# Patient Record
Sex: FEMALE | Race: WHITE | NOT HISPANIC OR LATINO | Employment: OTHER | URBAN - NONMETROPOLITAN AREA
[De-identification: names, ages, dates, MRNs, and addresses within clinical notes are randomized per-mention and may not be internally consistent; named-entity substitution may affect disease eponyms.]

---

## 2022-03-23 ENCOUNTER — OFFICE VISIT (OUTPATIENT)
Dept: FAMILY MEDICINE CLINIC | Facility: CLINIC | Age: 62
End: 2022-03-23

## 2022-03-23 VITALS — BODY MASS INDEX: 37.47 KG/M2 | WEIGHT: 253 LBS | HEIGHT: 69 IN

## 2022-03-23 DIAGNOSIS — R06.02 SHORTNESS OF BREATH: Primary | ICD-10-CM

## 2022-03-23 PROCEDURE — 99214 OFFICE O/P EST MOD 30 MIN: CPT | Performed by: PHYSICIAN ASSISTANT

## 2022-03-23 RX ORDER — FLUOXETINE HYDROCHLORIDE 40 MG/1
CAPSULE ORAL
COMMUNITY
Start: 2022-03-11 | End: 2023-03-10 | Stop reason: SDUPTHER

## 2022-03-23 RX ORDER — ALBUTEROL SULFATE 90 UG/1
AEROSOL, METERED RESPIRATORY (INHALATION)
COMMUNITY
End: 2022-04-13 | Stop reason: SDUPTHER

## 2022-03-23 RX ORDER — LEVOTHYROXINE SODIUM 0.07 MG/1
TABLET ORAL
COMMUNITY
End: 2023-03-10 | Stop reason: SDUPTHER

## 2022-03-23 RX ORDER — DICLOFENAC SODIUM 75 MG/1
TABLET, DELAYED RELEASE ORAL
COMMUNITY
End: 2022-04-14

## 2022-03-23 RX ORDER — PSEUDOEPHEDRINE HCL 30 MG
TABLET ORAL
COMMUNITY

## 2022-03-23 RX ORDER — FUROSEMIDE 20 MG/1
20 TABLET ORAL 2 TIMES DAILY
Qty: 30 TABLET | Refills: 5 | Status: SHIPPED | OUTPATIENT
Start: 2022-03-23 | End: 2022-06-13

## 2022-03-23 RX ORDER — OXYBUTYNIN CHLORIDE 5 MG/1
TABLET ORAL
COMMUNITY
End: 2023-03-10 | Stop reason: SDUPTHER

## 2022-03-23 RX ORDER — PANTOPRAZOLE SODIUM 20 MG/1
TABLET, DELAYED RELEASE ORAL
COMMUNITY
Start: 2022-02-04 | End: 2023-03-10 | Stop reason: SDUPTHER

## 2022-03-23 RX ORDER — FLUOXETINE HYDROCHLORIDE 40 MG/1
CAPSULE ORAL
COMMUNITY
End: 2022-03-25 | Stop reason: SDUPTHER

## 2022-03-23 RX ORDER — IBUPROFEN 800 MG/1
TABLET ORAL
COMMUNITY
Start: 2022-01-18 | End: 2022-03-23

## 2022-03-23 RX ORDER — HYDROCODONE BITARTRATE AND ACETAMINOPHEN 10; 325 MG/1; MG/1
TABLET ORAL
COMMUNITY
End: 2022-04-14 | Stop reason: ALTCHOICE

## 2022-03-23 RX ORDER — METOCLOPRAMIDE 10 MG/1
TABLET ORAL
COMMUNITY
End: 2023-03-10 | Stop reason: SDUPTHER

## 2022-03-23 RX ORDER — GABAPENTIN 800 MG/1
TABLET ORAL
COMMUNITY
Start: 2021-10-25 | End: 2022-04-14 | Stop reason: SDUPTHER

## 2022-03-23 NOTE — PROGRESS NOTES
"Chief Complaint  Cough, Nasal Congestion (X2 wks), and Shortness of Breath (Patient reports ongoing shortness of breath. )    Subjective  You have chosen to receive care through a telehealth visit.  Do you consent to use a video/audio connection for your medical care today? Yes        Aanstasiia Avilez presents to [facility]  History of Present Illness Ms. Campos reports ongoing upper respiratory symptoms for the past 2 weeks.  She complains of shortness of breath, cough and congestion.  She has been treated on 2 different occasions for upper respiratory infections with antibiotics and steroids with no improvement in her symptoms.  On Monday she presented to the emergency room reporting the same types of symptoms they did a work-up including EKG chest x-ray labs etc. and subsequently told her she had congestive heart failure and she needed to follow-up with a cardiologist.  She has an appointment but cannot get in until sometime in April and she is doing a phone visit today because ER told her she might benefit from fluid pills however they did not prescribe any.    Objective     Vital Signs:   Ht 175.3 cm (69\")   Wt 115 kg (253 lb)   BMI 37.36 kg/m²     Body mass index is 37.36 kg/m².    Review of Systems   Constitutional: Negative.  Negative for fever.   HENT: Positive for postnasal drip.    Respiratory: Positive for cough and shortness of breath. Negative for wheezing.    Cardiovascular: Negative for chest pain, palpitations and leg swelling.   Neurological: Negative.    Psychiatric/Behavioral: Negative.        Social History: reports that she has been smoking cigarettes. She has a 40.00 pack-year smoking history. She has never used smokeless tobacco. She reports that she does not drink alcohol and does not use drugs.      Current Outpatient Medications:   •  albuterol sulfate  (90 Base) MCG/ACT inhaler, Ventolin HFA 90 mcg/actuation aerosol inhaler, Disp: , Rfl:   •  " diclofenac (VOLTAREN) 75 MG EC tablet, diclofenac sodium 75 mg tablet,delayed release, Disp: , Rfl:   •  docusate sodium 100 MG capsule, docusate sodium 100 mg capsule, Disp: , Rfl:   •  FLUoxetine (PROzac) 40 MG capsule, , Disp: , Rfl:   •  FLUoxetine (PROzac) 40 MG capsule, fluoxetine 40 mg capsule, Disp: , Rfl:   •  gabapentin (NEURONTIN) 800 MG tablet, gabapentin 800 mg tablet, Disp: , Rfl:   •  HYDROcodone-acetaminophen (NORCO)  MG per tablet, hydrocodone 10 mg-acetaminophen 325 mg tablet, Disp: , Rfl:   •  levothyroxine (SYNTHROID, LEVOTHROID) 75 MCG tablet, levothyroxine 75 mcg tablet, Disp: , Rfl:   •  metoclopramide (REGLAN) 10 MG tablet, metoclopramide 10 mg tablet, Disp: , Rfl:   •  oxybutynin (DITROPAN) 5 MG tablet, oxybutynin chloride 5 mg tablet, Disp: , Rfl:   •  pantoprazole (PROTONIX) 20 MG EC tablet, pantoprazole 20 mg tablet,delayed release, Disp: , Rfl:   •  ProAir  (90 Base) MCG/ACT inhaler, , Disp: , Rfl:   •  furosemide (Lasix) 20 MG tablet, Take 1 tablet by mouth 2 (Two) Times a Day., Disp: 30 tablet, Rfl: 5    Allergies: Sulfa antibiotics    Physical Exam  Constitutional:       Appearance: Normal appearance.   Neurological:      Mental Status: She is alert and oriented to person, place, and time.             Assessment and Plan   Diagnoses and all orders for this visit:    1. Shortness of breath (Primary)  Assessment & Plan:  I am going to start her on Lasix and see her in the office on Friday to see how she is responding. She is to go to the ER if her shortness of breath worsens or she experiences chest pain.        Other orders  -     furosemide (Lasix) 20 MG tablet; Take 1 tablet by mouth 2 (Two) Times a Day.  Dispense: 30 tablet; Refill: 5          Follow Up   Return in about 2 days (around 3/25/2022).  Patient was given instructions and counseling regarding her condition or for health maintenance advice. Please see specific information pulled into the AVS if appropriate.      Radha Means, PA-C

## 2022-03-23 NOTE — ASSESSMENT & PLAN NOTE
I am going to start her on Lasix and see her in the office on Friday to see how she is responding. She is to go to the ER if her shortness of breath worsens or she experiences chest pain.

## 2022-03-24 PROBLEM — M53.9 MULTILEVEL DEGENERATIVE DISC DISEASE: Status: ACTIVE | Noted: 2022-03-24

## 2022-03-24 PROBLEM — N18.31 STAGE 3A CHRONIC KIDNEY DISEASE: Status: ACTIVE | Noted: 2022-03-24

## 2022-03-25 ENCOUNTER — OFFICE VISIT (OUTPATIENT)
Dept: FAMILY MEDICINE CLINIC | Facility: CLINIC | Age: 62
End: 2022-03-25

## 2022-03-25 VITALS
DIASTOLIC BLOOD PRESSURE: 86 MMHG | SYSTOLIC BLOOD PRESSURE: 124 MMHG | HEART RATE: 71 BPM | WEIGHT: 249.8 LBS | HEIGHT: 69 IN | OXYGEN SATURATION: 98 % | BODY MASS INDEX: 37 KG/M2

## 2022-03-25 DIAGNOSIS — R06.02 SHORTNESS OF BREATH: Primary | ICD-10-CM

## 2022-03-25 PROBLEM — M54.16 LUMBAR RADICULOPATHY: Status: ACTIVE | Noted: 2019-07-25

## 2022-03-25 PROBLEM — M47.817 LUMBOSACRAL SPONDYLOSIS WITHOUT MYELOPATHY: Status: ACTIVE | Noted: 2019-07-25

## 2022-03-25 PROCEDURE — 99214 OFFICE O/P EST MOD 30 MIN: CPT | Performed by: PHYSICIAN ASSISTANT

## 2022-03-25 RX ORDER — HYDROXYZINE HYDROCHLORIDE 25 MG/1
TABLET, FILM COATED ORAL
COMMUNITY
Start: 2022-02-08 | End: 2022-06-24

## 2022-03-25 RX ORDER — TRAZODONE HYDROCHLORIDE 150 MG/1
TABLET ORAL
COMMUNITY
End: 2022-03-25 | Stop reason: SDUPTHER

## 2022-03-25 RX ORDER — TRAZODONE HYDROCHLORIDE 150 MG/1
TABLET ORAL
COMMUNITY
Start: 2022-03-11 | End: 2022-05-06

## 2022-03-25 RX ORDER — SILVER SULFADIAZINE 1 %
CREAM (GRAM) TOPICAL
COMMUNITY
Start: 2022-01-10

## 2022-03-25 RX ORDER — PREDNISONE 20 MG/1
TABLET ORAL
COMMUNITY
End: 2022-03-25

## 2022-03-25 RX ORDER — LEVOFLOXACIN 500 MG/1
500 TABLET, FILM COATED ORAL DAILY
Qty: 10 TABLET | Refills: 0 | Status: SHIPPED | OUTPATIENT
Start: 2022-03-25 | End: 2022-04-14

## 2022-03-25 RX ORDER — PROMETHAZINE HYDROCHLORIDE 25 MG/1
25 TABLET ORAL EVERY 6 HOURS PRN
Qty: 20 TABLET | Refills: 1 | Status: SHIPPED | OUTPATIENT
Start: 2022-03-25 | End: 2022-04-22 | Stop reason: SDUPTHER

## 2022-03-25 RX ORDER — TOLTERODINE 4 MG/1
CAPSULE, EXTENDED RELEASE ORAL
COMMUNITY
End: 2023-03-10 | Stop reason: SDUPTHER

## 2022-03-25 RX ORDER — PROMETHAZINE HYDROCHLORIDE 25 MG/1
TABLET ORAL EVERY 4 HOURS
COMMUNITY
Start: 2022-03-10 | End: 2022-03-25 | Stop reason: SDUPTHER

## 2022-03-25 RX ORDER — TIZANIDINE 4 MG/1
TABLET ORAL
COMMUNITY
End: 2022-04-20

## 2022-03-25 NOTE — PROGRESS NOTES
"Chief Complaint  Shortness of Breath (X2 months)    Subjective          Anastasiia Avilez presents to [facility]  History of Present Illness She comes in because she continues to complain of SOA.  She is still having some cough but that has gradually improved, she stated that she went to the ER and they did an EKG, Labs and a CXR and told her she had an enlarged heart and needed to see a cardiologist.  She had a telehealth visit a few days ago and I put her on Lasix and told her to come in, but she says the Lasix has not helped.     Objective     Vital Signs:   /86 (BP Location: Right arm, Patient Position: Sitting, Cuff Size: Large Adult)   Pulse 71   Ht 175.3 cm (69\")   Wt 113 kg (249 lb 12.8 oz)   SpO2 98%   BMI 36.89 kg/m²     Body mass index is 36.89 kg/m².    Review of Systems   Constitutional: Negative.    HENT: Negative.    Respiratory: Positive for cough and shortness of breath. Negative for chest tightness and wheezing.    Cardiovascular: Negative for chest pain, palpitations and leg swelling.   Psychiatric/Behavioral: Negative.        Social History: reports that she has been smoking cigarettes. She has a 40.00 pack-year smoking history. She has never used smokeless tobacco. She reports that she does not drink alcohol and does not use drugs.      Current Outpatient Medications:   •  albuterol sulfate  (90 Base) MCG/ACT inhaler, Ventolin HFA 90 mcg/actuation aerosol inhaler, Disp: , Rfl:   •  diclofenac (VOLTAREN) 75 MG EC tablet, diclofenac sodium 75 mg tablet,delayed release, Disp: , Rfl:   •  FLUoxetine (PROzac) 40 MG capsule, , Disp: , Rfl:   •  furosemide (Lasix) 20 MG tablet, Take 1 tablet by mouth 2 (Two) Times a Day., Disp: 30 tablet, Rfl: 5  •  gabapentin (NEURONTIN) 800 MG tablet, gabapentin 800 mg tablet, Disp: , Rfl:   •  levothyroxine (SYNTHROID, LEVOTHROID) 75 MCG tablet, levothyroxine 75 mcg tablet, Disp: , Rfl:   •  metoclopramide (REGLAN) 10 MG " tablet, metoclopramide 10 mg tablet, Disp: , Rfl:   •  oxybutynin (DITROPAN) 5 MG tablet, oxybutynin chloride 5 mg tablet, Disp: , Rfl:   •  pantoprazole (PROTONIX) 20 MG EC tablet, pantoprazole 20 mg tablet,delayed release, Disp: , Rfl:   •  ProAir  (90 Base) MCG/ACT inhaler, , Disp: , Rfl:   •  promethazine (PHENERGAN) 25 MG tablet, Take 1 tablet by mouth Every 6 (Six) Hours As Needed for Nausea or Vomiting., Disp: 20 tablet, Rfl: 1  •  SSD 1 % cream, , Disp: , Rfl:   •  tiZANidine (ZANAFLEX) 4 MG tablet, tizanidine 4 mg tablet, Disp: , Rfl:   •  tolterodine LA (DETROL LA) 4 MG 24 hr capsule, tolterodine ER 4 mg capsule,extended release 24 hr, Disp: , Rfl:   •  traZODone (DESYREL) 150 MG tablet, , Disp: , Rfl:   •  docusate sodium 100 MG capsule, docusate sodium 100 mg capsule, Disp: , Rfl:   •  Fluticasone Furoate-Vilanterol (Breo Ellipta) 100-25 MCG/INH inhaler, Inhale 1 puff Daily., Disp: 1 each, Rfl: 12  •  HYDROcodone-acetaminophen (NORCO)  MG per tablet, hydrocodone 10 mg-acetaminophen 325 mg tablet, Disp: , Rfl:   •  hydrOXYzine (ATARAX) 25 MG tablet, , Disp: , Rfl:   •  levoFLOXacin (LEVAQUIN) 500 MG tablet, Take 1 tablet by mouth Daily., Disp: 10 tablet, Rfl: 0    Allergies: Sulfa antibiotics    Physical Exam  Constitutional:       Appearance: She is obese.   Cardiovascular:      Rate and Rhythm: Normal rate and regular rhythm.      Heart sounds: Normal heart sounds.   Pulmonary:      Effort: Pulmonary effort is normal.      Breath sounds: Normal breath sounds.   Abdominal:      General: Bowel sounds are normal.      Palpations: Abdomen is soft.   Musculoskeletal:         General: Normal range of motion.      Cervical back: Normal range of motion.   Neurological:      General: No focal deficit present.      Mental Status: She is alert and oriented to person, place, and time.   Psychiatric:         Mood and Affect: Mood normal.             Assessment and Plan   Diagnoses and all orders for  this visit:    1. Shortness of breath (Primary)  Assessment & Plan:  She has an appointment with cardiology on April 1st for possible CHF.  I did put her on Lasix but she did not get any relief in symptoms. I am going to put her on Levaquin in case this is an underlying pneumonia and I'm adding Breo.  I will see her back next week.      Orders:  -     Basic Metabolic Panel; Future  -     Basic Metabolic Panel    Other orders  -     promethazine (PHENERGAN) 25 MG tablet; Take 1 tablet by mouth Every 6 (Six) Hours As Needed for Nausea or Vomiting.  Dispense: 20 tablet; Refill: 1  -     levoFLOXacin (LEVAQUIN) 500 MG tablet; Take 1 tablet by mouth Daily.  Dispense: 10 tablet; Refill: 0  -     Fluticasone Furoate-Vilanterol (Breo Ellipta) 100-25 MCG/INH inhaler; Inhale 1 puff Daily.  Dispense: 1 each; Refill: 12            Follow Up   Return in about 1 week (around 4/1/2022) for Recheck possible pneumonia.  Patient was given instructions and counseling regarding her condition or for health maintenance advice. Please see specific information pulled into the AVS if appropriate.     Radha Valladares PA-C

## 2022-03-25 NOTE — ASSESSMENT & PLAN NOTE
She has an appointment with cardiology on April 1st for possible CHF.  I did put her on Lasix but she did not get any relief in symptoms. I am going to put her on Levaquin in case this is an underlying pneumonia and I'm adding Breo.  I will see her back next week.

## 2022-03-26 LAB
BUN SERPL-MCNC: 14 MG/DL (ref 8–27)
BUN/CREAT SERPL: 12 (ref 12–28)
CALCIUM SERPL-MCNC: 9.5 MG/DL (ref 8.7–10.3)
CHLORIDE SERPL-SCNC: 100 MMOL/L (ref 96–106)
CO2 SERPL-SCNC: 26 MMOL/L (ref 20–29)
CREAT SERPL-MCNC: 1.14 MG/DL (ref 0.57–1)
EGFRCR SERPLBLD CKD-EPI 2021: 55 ML/MIN/1.73
GLUCOSE SERPL-MCNC: 87 MG/DL (ref 65–99)
POTASSIUM SERPL-SCNC: 4.5 MMOL/L (ref 3.5–5.2)
SODIUM SERPL-SCNC: 142 MMOL/L (ref 134–144)

## 2022-04-07 ENCOUNTER — TELEPHONE (OUTPATIENT)
Dept: FAMILY MEDICINE CLINIC | Facility: CLINIC | Age: 62
End: 2022-04-07

## 2022-04-11 RX ORDER — GABAPENTIN 800 MG/1
TABLET ORAL
Status: CANCELLED | OUTPATIENT
Start: 2022-04-11

## 2022-04-12 RX ORDER — GABAPENTIN 800 MG/1
TABLET ORAL
Qty: 60 TABLET | Refills: 0 | OUTPATIENT
Start: 2022-04-12

## 2022-04-13 RX ORDER — ALBUTEROL SULFATE 90 UG/1
1 AEROSOL, METERED RESPIRATORY (INHALATION) EVERY 6 HOURS PRN
Qty: 1 G | Refills: 3 | Status: SHIPPED | OUTPATIENT
Start: 2022-04-13 | End: 2023-03-10 | Stop reason: SDUPTHER

## 2022-04-14 ENCOUNTER — OFFICE VISIT (OUTPATIENT)
Dept: FAMILY MEDICINE CLINIC | Facility: CLINIC | Age: 62
End: 2022-04-14

## 2022-04-14 VITALS
HEIGHT: 69 IN | HEART RATE: 70 BPM | BODY MASS INDEX: 43.4 KG/M2 | DIASTOLIC BLOOD PRESSURE: 76 MMHG | WEIGHT: 293 LBS | SYSTOLIC BLOOD PRESSURE: 127 MMHG | OXYGEN SATURATION: 97 %

## 2022-04-14 DIAGNOSIS — M47.817 LUMBOSACRAL SPONDYLOSIS WITHOUT MYELOPATHY: ICD-10-CM

## 2022-04-14 DIAGNOSIS — M53.9 MULTILEVEL DEGENERATIVE DISC DISEASE: Primary | ICD-10-CM

## 2022-04-14 DIAGNOSIS — M54.16 LUMBAR RADICULOPATHY: ICD-10-CM

## 2022-04-14 LAB
MDMA UR QL SCN: NEGATIVE
POC AMPHETAMINES: NEGATIVE
POC BARBITURATES: NEGATIVE
POC BENZODIAZEPHINES: NEGATIVE
POC COCAINE: NEGATIVE
POC METHADONE: NEGATIVE
POC METHAMPHETAMINE SCREEN URINE: NEGATIVE
POC OPIATES: NEGATIVE
POC OXYCODONE: NEGATIVE
POC PHENCYCLIDINE: NEGATIVE
POC THC: NEGATIVE
POC TRICYCLIC ANTIDEPRESSANTS: NEGATIVE

## 2022-04-14 PROCEDURE — 99213 OFFICE O/P EST LOW 20 MIN: CPT | Performed by: PHYSICIAN ASSISTANT

## 2022-04-14 PROCEDURE — 80305 DRUG TEST PRSMV DIR OPT OBS: CPT | Performed by: PHYSICIAN ASSISTANT

## 2022-04-14 RX ORDER — GABAPENTIN 800 MG/1
800 TABLET ORAL 3 TIMES DAILY
Qty: 90 TABLET | Refills: 5 | Status: SHIPPED | OUTPATIENT
Start: 2022-04-14 | End: 2022-07-13

## 2022-04-14 NOTE — PROGRESS NOTES
"Chief Complaint  Back Pain (Patient needs a refill on her pain med.)    Subjective          Anastasiia Avilez presents to [facility]  History of Present Illness patient comes in today requesting a refill specifically for gabapentin for her back pain she states that she has been on this medication for quite some time and we had restarted it at her last visit however we started her on twice a day and she had been accustomed to taking it 3 or 4 times a day.  She also used to take hydrocodone and has not had that in several months but she understands that she needs to see pain management for that.  Other than the chronic mobility limiting back pain that she suffers on a daily basis she has no complaints today.    Objective     Vital Signs:   /76 (BP Location: Left arm, Patient Position: Sitting, Cuff Size: Large Adult)   Pulse 70   Ht 175.3 cm (69\")   Wt (!) 138 kg (303 lb 3.2 oz)   SpO2 97%   BMI 44.77 kg/m²     Body mass index is 44.77 kg/m².    Review of Systems   Constitutional: Negative.    Respiratory: Negative.    Cardiovascular: Negative.    Musculoskeletal: Positive for back pain and neck pain.   Neurological: Negative.    Psychiatric/Behavioral: Negative.        Past History:  Medical History: has a past medical history of Multilevel degenerative disc disease.   Surgical History: has no past surgical history on file.   Family History: family history is not on file.   Social History: reports that she has been smoking cigarettes. She has a 40.00 pack-year smoking history. She has never used smokeless tobacco. She reports that she does not drink alcohol and does not use drugs.      Current Outpatient Medications:   •  albuterol sulfate  (90 Base) MCG/ACT inhaler, Inhale 1 puff Every 6 (Six) Hours As Needed for Wheezing., Disp: 1 g, Rfl: 3  •  docusate sodium 100 MG capsule, docusate sodium 100 mg capsule, Disp: , Rfl:   •  FLUoxetine (PROzac) 40 MG capsule, , Disp: , " Rfl:   •  Fluticasone Furoate-Vilanterol (Breo Ellipta) 100-25 MCG/INH inhaler, Inhale 1 puff Daily., Disp: 1 each, Rfl: 12  •  furosemide (Lasix) 20 MG tablet, Take 1 tablet by mouth 2 (Two) Times a Day., Disp: 30 tablet, Rfl: 5  •  gabapentin (NEURONTIN) 800 MG tablet, gabapentin 800 mg tablet, Disp: , Rfl:   •  hydrOXYzine (ATARAX) 25 MG tablet, , Disp: , Rfl:   •  levothyroxine (SYNTHROID, LEVOTHROID) 75 MCG tablet, levothyroxine 75 mcg tablet, Disp: , Rfl:   •  metoclopramide (REGLAN) 10 MG tablet, metoclopramide 10 mg tablet, Disp: , Rfl:   •  oxybutynin (DITROPAN) 5 MG tablet, oxybutynin chloride 5 mg tablet, Disp: , Rfl:   •  pantoprazole (PROTONIX) 20 MG EC tablet, pantoprazole 20 mg tablet,delayed release, Disp: , Rfl:   •  promethazine (PHENERGAN) 25 MG tablet, Take 1 tablet by mouth Every 6 (Six) Hours As Needed for Nausea or Vomiting., Disp: 20 tablet, Rfl: 1  •  SSD 1 % cream, , Disp: , Rfl:   •  tiZANidine (ZANAFLEX) 4 MG tablet, tizanidine 4 mg tablet, Disp: , Rfl:   •  tolterodine LA (DETROL LA) 4 MG 24 hr capsule, tolterodine ER 4 mg capsule,extended release 24 hr, Disp: , Rfl:   •  traZODone (DESYREL) 150 MG tablet, , Disp: , Rfl:     Allergies: Sulfa antibiotics    Physical Exam  Constitutional:       Appearance: Normal appearance.   Musculoskeletal:         General: Tenderness (of her lower back and she has limited ROM due to pain) present.      Cervical back: No pain with movement. Normal range of motion.      Lumbar back: Normal.   Neurological:      General: No focal deficit present.      Mental Status: She is alert and oriented to person, place, and time.   Psychiatric:         Mood and Affect: Mood normal.             Assessment and Plan   Diagnoses and all orders for this visit:    1. Multilevel degenerative disc disease (Primary)  Assessment & Plan:  Patient takes Gabapentin  for chronic back pain, remains stable on this medication, [place] report has been reviewed and a controlled  substance agreement has been signed.  Medication was refilled today. Told her to see pain management for further evaluation of treatment modalities to help her pain since the Gabapentin alone is not controlling her pain.        2. Lumbosacral spondylosis without myelopathy  -     POC Urine Drug Screen, Triage    3. Lumbar radiculopathy          Follow Up   Return in about 6 months (around 10/14/2022) for Recheck.  Patient was given instructions and counseling regarding her condition or for health maintenance advice. Please see specific information pulled into the AVS if appropriate.     Radha Valladaers PA-C

## 2022-04-14 NOTE — ASSESSMENT & PLAN NOTE
Patient takes Gabapentin  for chronic back pain, remains stable on this medication, [place]ISON report has been reviewed and a controlled substance agreement has been signed.  Medication was refilled today. Told her to see pain management for further evaluation of treatment modalities to help her pain since the Gabapentin alone is not controlling her pain.

## 2022-04-20 RX ORDER — TIZANIDINE 4 MG/1
TABLET ORAL
Qty: 30 TABLET | Refills: 0 | Status: SHIPPED | OUTPATIENT
Start: 2022-04-20

## 2022-04-22 ENCOUNTER — TELEPHONE (OUTPATIENT)
Dept: FAMILY MEDICINE CLINIC | Facility: CLINIC | Age: 62
End: 2022-04-22

## 2022-04-22 RX ORDER — PROMETHAZINE HYDROCHLORIDE 25 MG/1
25 TABLET ORAL EVERY 6 HOURS PRN
Qty: 30 TABLET | Refills: 1 | Status: SHIPPED | OUTPATIENT
Start: 2022-04-22 | End: 2022-06-02

## 2022-04-22 RX ORDER — PROMETHAZINE HYDROCHLORIDE 25 MG/1
TABLET ORAL
Qty: 20 TABLET | Refills: 0 | OUTPATIENT
Start: 2022-04-22

## 2022-04-22 NOTE — TELEPHONE ENCOUNTER
Pt came in saying that she needs a refill on this medication     Promethazine 25mg  Every six hours as needed     Pt said she was just seen   Please advise pt

## 2022-05-06 RX ORDER — TRAZODONE HYDROCHLORIDE 150 MG/1
TABLET ORAL
Qty: 30 TABLET | Refills: 0 | Status: SHIPPED | OUTPATIENT
Start: 2022-05-06 | End: 2022-06-02

## 2022-06-02 RX ORDER — PROMETHAZINE HYDROCHLORIDE 25 MG/1
TABLET ORAL
Qty: 30 TABLET | Refills: 0 | Status: SHIPPED | OUTPATIENT
Start: 2022-06-02 | End: 2023-02-13 | Stop reason: SDUPTHER

## 2022-06-02 RX ORDER — TRAZODONE HYDROCHLORIDE 150 MG/1
TABLET ORAL
Qty: 30 TABLET | Refills: 2 | Status: SHIPPED | OUTPATIENT
Start: 2022-06-02 | End: 2022-08-25

## 2022-06-13 RX ORDER — FUROSEMIDE 20 MG/1
TABLET ORAL
Qty: 60 TABLET | Refills: 0 | Status: SHIPPED | OUTPATIENT
Start: 2022-06-13 | End: 2023-03-10 | Stop reason: SDUPTHER

## 2022-06-24 RX ORDER — HYDROXYZINE HYDROCHLORIDE 25 MG/1
TABLET, FILM COATED ORAL
Qty: 90 TABLET | Refills: 0 | Status: SHIPPED | OUTPATIENT
Start: 2022-06-24 | End: 2022-07-21

## 2022-07-21 RX ORDER — HYDROXYZINE HYDROCHLORIDE 25 MG/1
TABLET, FILM COATED ORAL
Qty: 90 TABLET | Refills: 1 | Status: SHIPPED | OUTPATIENT
Start: 2022-07-21 | End: 2022-09-14

## 2022-08-25 RX ORDER — TRAZODONE HYDROCHLORIDE 150 MG/1
TABLET ORAL
Qty: 30 TABLET | Refills: 2 | Status: SHIPPED | OUTPATIENT
Start: 2022-08-25 | End: 2022-11-17

## 2022-09-12 RX ORDER — DICLOFENAC SODIUM 75 MG/1
TABLET, DELAYED RELEASE ORAL
Qty: 60 TABLET | Refills: 3 | Status: SHIPPED | OUTPATIENT
Start: 2022-09-12 | End: 2023-01-12

## 2022-09-14 RX ORDER — HYDROXYZINE HYDROCHLORIDE 25 MG/1
TABLET, FILM COATED ORAL
Qty: 90 TABLET | Refills: 0 | Status: SHIPPED | OUTPATIENT
Start: 2022-09-14 | End: 2022-10-14

## 2022-10-10 RX ORDER — ZOSTER VACCINE RECOMBINANT, ADJUVANTED 50 MCG/0.5
KIT INTRAMUSCULAR
Qty: 0.5 ML | Refills: 1 | Status: SHIPPED | OUTPATIENT
Start: 2022-10-10 | End: 2023-03-10

## 2022-10-14 RX ORDER — HYDROXYZINE HYDROCHLORIDE 25 MG/1
TABLET, FILM COATED ORAL
Qty: 90 TABLET | Refills: 0 | Status: SHIPPED | OUTPATIENT
Start: 2022-10-14 | End: 2022-11-11

## 2022-11-10 RX ORDER — HYDROXYZINE HYDROCHLORIDE 25 MG/1
TABLET, FILM COATED ORAL
Qty: 90 TABLET | Refills: 0 | OUTPATIENT
Start: 2022-11-10

## 2022-11-11 RX ORDER — HYDROXYZINE HYDROCHLORIDE 25 MG/1
TABLET, FILM COATED ORAL
Qty: 90 TABLET | Refills: 0 | Status: SHIPPED | OUTPATIENT
Start: 2022-11-11 | End: 2022-12-12

## 2022-11-17 RX ORDER — TRAZODONE HYDROCHLORIDE 150 MG/1
TABLET ORAL
Qty: 30 TABLET | Refills: 4 | Status: SHIPPED | OUTPATIENT
Start: 2022-11-17 | End: 2023-03-10 | Stop reason: SDUPTHER

## 2022-12-12 RX ORDER — HYDROXYZINE HYDROCHLORIDE 25 MG/1
TABLET, FILM COATED ORAL
Qty: 90 TABLET | Refills: 0 | Status: SHIPPED | OUTPATIENT
Start: 2022-12-12 | End: 2023-03-10 | Stop reason: SDUPTHER

## 2023-01-12 RX ORDER — DICLOFENAC SODIUM 75 MG/1
TABLET, DELAYED RELEASE ORAL
Qty: 60 TABLET | Refills: 0 | Status: SHIPPED | OUTPATIENT
Start: 2023-01-12 | End: 2023-02-10

## 2023-02-10 RX ORDER — DICLOFENAC SODIUM 75 MG/1
TABLET, DELAYED RELEASE ORAL
Qty: 60 TABLET | Refills: 2 | Status: SHIPPED | OUTPATIENT
Start: 2023-02-10

## 2023-02-13 RX ORDER — PROMETHAZINE HYDROCHLORIDE 25 MG/1
25 TABLET ORAL EVERY 6 HOURS PRN
Qty: 30 TABLET | Refills: 1 | Status: SHIPPED | OUTPATIENT
Start: 2023-02-13 | End: 2023-03-10 | Stop reason: SDUPTHER

## 2023-02-13 NOTE — TELEPHONE ENCOUNTER
Caller: Anastasiia Avilez    Relationship: Self    Best call back number:[phone]    Requested Prescriptions:   Requested Prescriptions     Pending Prescriptions Disp Refills   • promethazine (PHENERGAN) 25 MG tablet 30 tablet 0     Sig: Take 1 tablet by mouth Every 6 (Six) Hours As Needed for Nausea or Vomiting.        Pharmacy where request should be sent: [address] [facility] [phone] [facility] [phone] FX     Additional details provided by patient:   PATIENT IS COMPLETLEY OUT OF MEDICATION     Does the patient have less than a 3 day supply:  [x] Yes  [] No    Would you like a call back once the refill request has been completed: [x] Yes [] No    If the office needs to give you a call back, can they leave a voicemail: [x] Yes [] No    Evelyn Jones Rep   02/13/23 12:52 EST

## 2023-03-10 ENCOUNTER — OFFICE VISIT (OUTPATIENT)
Dept: FAMILY MEDICINE CLINIC | Facility: CLINIC | Age: 63
End: 2023-03-10
Payer: MEDICAID

## 2023-03-10 VITALS
HEART RATE: 78 BPM | WEIGHT: 240 LBS | OXYGEN SATURATION: 94 % | DIASTOLIC BLOOD PRESSURE: 80 MMHG | HEIGHT: 69 IN | BODY MASS INDEX: 35.55 KG/M2 | SYSTOLIC BLOOD PRESSURE: 126 MMHG

## 2023-03-10 DIAGNOSIS — N32.81 OVERACTIVE BLADDER: ICD-10-CM

## 2023-03-10 DIAGNOSIS — J41.8 MIXED SIMPLE AND MUCOPURULENT CHRONIC BRONCHITIS: ICD-10-CM

## 2023-03-10 DIAGNOSIS — R06.02 SHORTNESS OF BREATH: Primary | ICD-10-CM

## 2023-03-10 DIAGNOSIS — E03.9 ACQUIRED HYPOTHYROIDISM: ICD-10-CM

## 2023-03-10 DIAGNOSIS — K21.9 GASTROESOPHAGEAL REFLUX DISEASE, UNSPECIFIED WHETHER ESOPHAGITIS PRESENT: ICD-10-CM

## 2023-03-10 DIAGNOSIS — F32.A ANXIETY AND DEPRESSION: ICD-10-CM

## 2023-03-10 DIAGNOSIS — F41.9 ANXIETY AND DEPRESSION: ICD-10-CM

## 2023-03-10 DIAGNOSIS — R60.9 PERIPHERAL EDEMA: ICD-10-CM

## 2023-03-10 DIAGNOSIS — F17.200 SMOKER: ICD-10-CM

## 2023-03-10 DIAGNOSIS — G47.33 OBSTRUCTIVE SLEEP APNEA SYNDROME: ICD-10-CM

## 2023-03-10 PROBLEM — R60.0 PERIPHERAL EDEMA: Status: ACTIVE | Noted: 2022-08-16

## 2023-03-10 PROBLEM — F11.90 NARCOTIC DRUG USE: Status: ACTIVE | Noted: 2022-06-21

## 2023-03-10 PROBLEM — J90 PLEURAL EFFUSION: Status: ACTIVE | Noted: 2022-09-22

## 2023-03-10 PROBLEM — K74.60 UNSPECIFIED CIRRHOSIS OF LIVER: Status: ACTIVE | Noted: 2022-08-16

## 2023-03-10 PROBLEM — G89.29 CHRONIC PAIN: Status: ACTIVE | Noted: 2022-08-16

## 2023-03-10 PROBLEM — Z78.9 POOR HISTORIAN: Status: ACTIVE | Noted: 2022-06-21

## 2023-03-10 PROBLEM — E66.01 MORBID OBESITY: Status: ACTIVE | Noted: 2022-08-16

## 2023-03-10 PROBLEM — R59.0 HILAR LYMPHADENOPATHY: Status: ACTIVE | Noted: 2022-09-22

## 2023-03-10 PROBLEM — J44.9 CHRONIC OBSTRUCTIVE PULMONARY DISEASE: Status: ACTIVE | Noted: 2022-06-21

## 2023-03-10 PROBLEM — R09.89 SUSPECTED CHF (CONGESTIVE HEART FAILURE): Status: ACTIVE | Noted: 2022-06-21

## 2023-03-10 PROCEDURE — 99214 OFFICE O/P EST MOD 30 MIN: CPT | Performed by: PHYSICIAN ASSISTANT

## 2023-03-10 PROCEDURE — 1159F MED LIST DOCD IN RCRD: CPT | Performed by: PHYSICIAN ASSISTANT

## 2023-03-10 PROCEDURE — 1160F RVW MEDS BY RX/DR IN RCRD: CPT | Performed by: PHYSICIAN ASSISTANT

## 2023-03-10 RX ORDER — HYDROXYZINE HYDROCHLORIDE 25 MG/1
25 TABLET, FILM COATED ORAL 3 TIMES DAILY
Qty: 90 TABLET | Refills: 3 | Status: SHIPPED | OUTPATIENT
Start: 2023-03-10

## 2023-03-10 RX ORDER — OXYBUTYNIN CHLORIDE 5 MG/1
5 TABLET ORAL 2 TIMES DAILY
Qty: 60 TABLET | Refills: 5 | Status: SHIPPED | OUTPATIENT
Start: 2023-03-10

## 2023-03-10 RX ORDER — DOXYCYCLINE 100 MG/1
100 CAPSULE ORAL 2 TIMES DAILY
Qty: 20 CAPSULE | Refills: 0 | Status: SHIPPED | OUTPATIENT
Start: 2023-03-10

## 2023-03-10 RX ORDER — HYDROXYZINE HYDROCHLORIDE 25 MG/1
TABLET, FILM COATED ORAL
Qty: 90 TABLET | Refills: 0 | OUTPATIENT
Start: 2023-03-10

## 2023-03-10 RX ORDER — LEVOTHYROXINE SODIUM 0.07 MG/1
75 TABLET ORAL DAILY
Qty: 90 TABLET | Refills: 1 | Status: SHIPPED | OUTPATIENT
Start: 2023-03-10

## 2023-03-10 RX ORDER — MONTELUKAST SODIUM 10 MG/1
TABLET ORAL
COMMUNITY
Start: 2022-12-17 | End: 2023-03-10 | Stop reason: SDUPTHER

## 2023-03-10 RX ORDER — MONTELUKAST SODIUM 10 MG/1
10 TABLET ORAL NIGHTLY
Qty: 90 TABLET | Refills: 3 | Status: SHIPPED | OUTPATIENT
Start: 2023-03-10

## 2023-03-10 RX ORDER — TRAZODONE HYDROCHLORIDE 150 MG/1
TABLET ORAL
Qty: 60 TABLET | Refills: 5 | Status: SHIPPED | OUTPATIENT
Start: 2023-03-10

## 2023-03-10 RX ORDER — FLUTICASONE FUROATE AND VILANTEROL 100; 25 UG/1; UG/1
1 POWDER RESPIRATORY (INHALATION)
Qty: 1 EACH | Refills: 12 | Status: SHIPPED | OUTPATIENT
Start: 2023-03-10

## 2023-03-10 RX ORDER — METOCLOPRAMIDE 10 MG/1
5 TABLET ORAL 4 TIMES DAILY
Qty: 120 TABLET | Refills: 5 | Status: SHIPPED | OUTPATIENT
Start: 2023-03-10

## 2023-03-10 RX ORDER — HYDROCODONE BITARTRATE AND ACETAMINOPHEN 5; 325 MG/1; MG/1
TABLET ORAL
COMMUNITY
Start: 2023-02-25

## 2023-03-10 RX ORDER — PANTOPRAZOLE SODIUM 40 MG/1
40 TABLET, DELAYED RELEASE ORAL DAILY
Qty: 90 TABLET | Refills: 1 | Status: SHIPPED | OUTPATIENT
Start: 2023-03-10

## 2023-03-10 RX ORDER — FLUOXETINE HYDROCHLORIDE 40 MG/1
40 CAPSULE ORAL DAILY
Qty: 90 CAPSULE | Refills: 1 | Status: SHIPPED | OUTPATIENT
Start: 2023-03-10

## 2023-03-10 RX ORDER — FUROSEMIDE 40 MG/1
40 TABLET ORAL DAILY
Qty: 90 TABLET | Refills: 1 | Status: SHIPPED | OUTPATIENT
Start: 2023-03-10

## 2023-03-10 RX ORDER — PROMETHAZINE HYDROCHLORIDE 25 MG/1
25 TABLET ORAL EVERY 6 HOURS PRN
Qty: 30 TABLET | Refills: 1 | Status: SHIPPED | OUTPATIENT
Start: 2023-03-10

## 2023-03-10 RX ORDER — ALBUTEROL SULFATE 90 UG/1
2 AEROSOL, METERED RESPIRATORY (INHALATION) EVERY 4 HOURS PRN
Qty: 18 G | Refills: 12 | Status: SHIPPED | OUTPATIENT
Start: 2023-03-10

## 2023-03-10 NOTE — ASSESSMENT & PLAN NOTE
COPD is not well controlled I am refilling her albuterol and Breo inhalers which she has not been using lately again we discussed the importance of smoking cessation.

## 2023-03-10 NOTE — PROGRESS NOTES
"Chief Complaint  Shortness of Breath (Started about a year ago )    Subjective          Anastasiia Avilez presents to [facility]  History of Present Illness patient states that she has been short of breath for at least a year and she thinks it may be a little worse.  It is constant it is present regardless of her activity level but she admits that she continues to smoke on a daily basis.  She has not had other associated complaints.  She does have untreated sleep apnea, she refuses to use the Cpap.  She also mentions that she has been out of several of her medications and we need to get her back on track with her meds.    Objective   Vital Signs:   /80 (BP Location: Right arm, Patient Position: Sitting, Cuff Size: Adult)   Pulse 78   Ht 175.3 cm (69\")   Wt 109 kg (240 lb)   SpO2 94%   BMI 35.44 kg/m²     Body mass index is 35.44 kg/m².    Review of Systems   Constitutional: Negative for chills, fatigue and fever.   HENT: Negative.    Respiratory: Positive for shortness of breath. Negative for cough and wheezing.    Cardiovascular: Negative for chest pain, palpitations and leg swelling.   Musculoskeletal: Positive for back pain. Negative for myalgias.   Neurological: Negative for dizziness and headache.   Psychiatric/Behavioral: Negative for depressed mood. The patient is not nervous/anxious.        Past History:  Medical History: has a past medical history of Multilevel degenerative disc disease.   Surgical History: has no past surgical history on file.   Family History: family history is not on file.   Social History: reports that she has been smoking cigarettes. She has a 40.00 pack-year smoking history. She has never used smokeless tobacco. She reports that she does not drink alcohol and does not use drugs.      Current Outpatient Medications:   •  albuterol sulfate  (90 Base) MCG/ACT inhaler, Inhale 2 puffs Every 4 (Four) Hours As Needed for Wheezing., Disp: 18 g, " Rfl: 12  •  diclofenac (VOLTAREN) 75 MG EC tablet, TAKE ONE TABLET BY MOUTH 2 TIMES A DAY, Disp: 60 tablet, Rfl: 2  •  FLUoxetine (PROzac) 40 MG capsule, Take 1 capsule by mouth Daily., Disp: 90 capsule, Rfl: 1  •  Fluticasone Furoate-Vilanterol (Breo Ellipta) 100-25 MCG/ACT aerosol powder , Inhale 1 puff Daily., Disp: 1 each, Rfl: 12  •  furosemide (LASIX) 40 MG tablet, Take 1 tablet by mouth Daily., Disp: 90 tablet, Rfl: 1  •  hydrOXYzine (ATARAX) 25 MG tablet, Take 1 tablet by mouth 3 (Three) Times a Day., Disp: 90 tablet, Rfl: 3  •  levothyroxine (SYNTHROID, LEVOTHROID) 75 MCG tablet, Take 1 tablet by mouth Daily., Disp: 90 tablet, Rfl: 1  •  metoclopramide (REGLAN) 10 MG tablet, Take 0.5 tablets by mouth 4 (Four) Times a Day., Disp: 120 tablet, Rfl: 5  •  montelukast (SINGULAIR) 10 MG tablet, Take 1 tablet by mouth Every Night., Disp: 90 tablet, Rfl: 3  •  oxybutynin (DITROPAN) 5 MG tablet, Take 1 tablet by mouth 2 (Two) Times a Day., Disp: 60 tablet, Rfl: 5  •  pantoprazole (PROTONIX) 40 MG EC tablet, Take 1 tablet by mouth Daily., Disp: 90 tablet, Rfl: 1  •  promethazine (PHENERGAN) 25 MG tablet, Take 1 tablet by mouth Every 6 (Six) Hours As Needed for Nausea or Vomiting., Disp: 30 tablet, Rfl: 1  •  SSD 1 % cream, , Disp: , Rfl:   •  tiZANidine (ZANAFLEX) 4 MG tablet, TAKE ONE TABLET BY MOUTH 3 TIMES A DAY, Disp: 30 tablet, Rfl: 0  •  traZODone (DESYREL) 150 MG tablet, Take 1 or 2 every bedtime for sleep, Disp: 60 tablet, Rfl: 5  •  docusate sodium 100 MG capsule, docusate sodium 100 mg capsule, Disp: , Rfl:   •  gabapentin (NEURONTIN) 800 MG tablet, Take 1 tablet by mouth 3 (Three) Times a Day for 90 days., Disp: 90 tablet, Rfl: 5  •  HYDROcodone-acetaminophen (NORCO) 5-325 MG per tablet, , Disp: , Rfl:     Allergies: Sulfa antibiotics and Adhesive tape    Physical Exam  Vitals reviewed.   Constitutional:       Appearance: She is obese.   Cardiovascular:      Rate and Rhythm: Normal rate and regular rhythm.       Heart sounds: Normal heart sounds.   Pulmonary:      Effort: Pulmonary effort is normal. No respiratory distress.      Breath sounds: Decreased breath sounds present. No wheezing, rhonchi or rales.   Abdominal:      General: Bowel sounds are normal.      Palpations: Abdomen is soft.   Musculoskeletal:         General: Normal range of motion.   Neurological:      General: No focal deficit present.      Mental Status: She is alert and oriented to person, place, and time.   Psychiatric:         Mood and Affect: Mood normal.          Result Review :                   Assessment and Plan    Diagnoses and all orders for this visit:    1. Shortness of breath (Primary)  Assessment & Plan:  She has multiple reasons for shortness of breath we again discussed the importance of quitting smoking as she does have underlying COPD she has not been using her inhaler consistently and we discussed the importance of that.  Were also going to restart her fluid pills as the excess swelling may be affecting some of her breathing and the fact that she does not use her CPAP at night may be contributing to this.  Going to get some screening blood work and a chest x-ray as well and we will follow-up with her in 4 weeks.    Orders:  -     CBC & Differential; Future  -     Comprehensive Metabolic Panel; Future  -     Lipid Panel; Future  -     XR Chest PA & Lateral; Future  -     CBC & Differential  -     Comprehensive Metabolic Panel  -     Lipid Panel    2. Mixed simple and mucopurulent chronic bronchitis (HCC)  Assessment & Plan:  COPD is not well controlled I am refilling her albuterol and Breo inhalers which she has not been using lately again we discussed the importance of smoking cessation.      3. Obstructive sleep apnea syndrome  Assessment & Plan:  Patient continues to refuse appropriate treatment for her sleep apnea and this may very well be contributing to her overall decline.      4. Peripheral edema  Assessment &  Plan:  Patient has not been taking her Lasix and that is being refilled today.      5. Smoker  Assessment & Plan:  Smoking cessation counseling was done today.      6. Acquired hypothyroidism  Assessment & Plan:  I am going to resume her thyroid medication I have advised her to start by taking a half a tablet for the first 2 weeks and then increase to 1 full tablet and baseline TSH will be obtained today.    Orders:  -     TSH; Future  -     TSH    7. Gastroesophageal reflux disease, unspecified whether esophagitis present  Assessment & Plan:  Continue present care no changes meds refilled.      8. Anxiety and depression  Assessment & Plan:   Continue present care no changes meds refilled.      9. Overactive bladder  Assessment & Plan:  Continue present care no changes meds refilled.      Other orders  -     pantoprazole (PROTONIX) 40 MG EC tablet; Take 1 tablet by mouth Daily.  Dispense: 90 tablet; Refill: 1  -     promethazine (PHENERGAN) 25 MG tablet; Take 1 tablet by mouth Every 6 (Six) Hours As Needed for Nausea or Vomiting.  Dispense: 30 tablet; Refill: 1  -     metoclopramide (REGLAN) 10 MG tablet; Take 0.5 tablets by mouth 4 (Four) Times a Day.  Dispense: 120 tablet; Refill: 5  -     levothyroxine (SYNTHROID, LEVOTHROID) 75 MCG tablet; Take 1 tablet by mouth Daily.  Dispense: 90 tablet; Refill: 1  -     furosemide (LASIX) 40 MG tablet; Take 1 tablet by mouth Daily.  Dispense: 90 tablet; Refill: 1  -     Fluticasone Furoate-Vilanterol (Breo Ellipta) 100-25 MCG/ACT aerosol powder ; Inhale 1 puff Daily.  Dispense: 1 each; Refill: 12  -     FLUoxetine (PROzac) 40 MG capsule; Take 1 capsule by mouth Daily.  Dispense: 90 capsule; Refill: 1  -     albuterol sulfate  (90 Base) MCG/ACT inhaler; Inhale 2 puffs Every 4 (Four) Hours As Needed for Wheezing.  Dispense: 18 g; Refill: 12  -     hydrOXYzine (ATARAX) 25 MG tablet; Take 1 tablet by mouth 3 (Three) Times a Day.  Dispense: 90 tablet; Refill: 3  -      montelukast (SINGULAIR) 10 MG tablet; Take 1 tablet by mouth Every Night.  Dispense: 90 tablet; Refill: 3  -     oxybutynin (DITROPAN) 5 MG tablet; Take 1 tablet by mouth 2 (Two) Times a Day.  Dispense: 60 tablet; Refill: 5  -     traZODone (DESYREL) 150 MG tablet; Take 1 or 2 every bedtime for sleep  Dispense: 60 tablet; Refill: 5      Follow Up   Return in about 4 weeks (around 4/7/2023) for Recheck.  Patient was given instructions and counseling regarding her condition or for health maintenance advice. Please see specific information pulled into the AVS if appropriate.     Radha Valladares PA-C

## 2023-03-10 NOTE — ASSESSMENT & PLAN NOTE
Patient continues to refuse appropriate treatment for her sleep apnea and this may very well be contributing to her overall decline.

## 2023-03-10 NOTE — ASSESSMENT & PLAN NOTE
I am going to resume her thyroid medication I have advised her to start by taking a half a tablet for the first 2 weeks and then increase to 1 full tablet and baseline TSH will be obtained today.

## 2023-03-11 ENCOUNTER — TELEPHONE (OUTPATIENT)
Dept: FAMILY MEDICINE CLINIC | Facility: CLINIC | Age: 63
End: 2023-03-11
Payer: MEDICAID

## 2023-03-11 LAB
ALBUMIN SERPL-MCNC: 3.9 G/DL (ref 3.8–4.8)
ALBUMIN/GLOB SERPL: 1.6 {RATIO} (ref 1.2–2.2)
ALP SERPL-CCNC: 102 IU/L (ref 44–121)
ALT SERPL-CCNC: 31 IU/L (ref 0–32)
AST SERPL-CCNC: 20 IU/L (ref 0–40)
BASOPHILS # BLD AUTO: 0.1 X10E3/UL (ref 0–0.2)
BASOPHILS NFR BLD AUTO: 1 %
BILIRUB SERPL-MCNC: 0.3 MG/DL (ref 0–1.2)
BUN SERPL-MCNC: 14 MG/DL (ref 8–27)
BUN/CREAT SERPL: 15 (ref 12–28)
CALCIUM SERPL-MCNC: 9.1 MG/DL (ref 8.7–10.3)
CHLORIDE SERPL-SCNC: 101 MMOL/L (ref 96–106)
CHOLEST SERPL-MCNC: 185 MG/DL (ref 100–199)
CO2 SERPL-SCNC: 28 MMOL/L (ref 20–29)
CREAT SERPL-MCNC: 0.96 MG/DL (ref 0.57–1)
EGFRCR SERPLBLD CKD-EPI 2021: 67 ML/MIN/1.73
EOSINOPHIL # BLD AUTO: 0.3 X10E3/UL (ref 0–0.4)
EOSINOPHIL NFR BLD AUTO: 3 %
ERYTHROCYTE [DISTWIDTH] IN BLOOD BY AUTOMATED COUNT: 16.9 % (ref 11.7–15.4)
GLOBULIN SER CALC-MCNC: 2.4 G/DL (ref 1.5–4.5)
GLUCOSE SERPL-MCNC: 78 MG/DL (ref 70–99)
HCT VFR BLD AUTO: 41.1 % (ref 34–46.6)
HDLC SERPL-MCNC: 58 MG/DL
HGB BLD-MCNC: 12.9 G/DL (ref 11.1–15.9)
IMM GRANULOCYTES # BLD AUTO: 0 X10E3/UL (ref 0–0.1)
IMM GRANULOCYTES NFR BLD AUTO: 1 %
LDLC SERPL CALC-MCNC: 109 MG/DL (ref 0–99)
LYMPHOCYTES # BLD AUTO: 0.7 X10E3/UL (ref 0.7–3.1)
LYMPHOCYTES NFR BLD AUTO: 9 %
MCH RBC QN AUTO: 26.3 PG (ref 26.6–33)
MCHC RBC AUTO-ENTMCNC: 31.4 G/DL (ref 31.5–35.7)
MCV RBC AUTO: 84 FL (ref 79–97)
MONOCYTES # BLD AUTO: 0.5 X10E3/UL (ref 0.1–0.9)
MONOCYTES NFR BLD AUTO: 7 %
NEUTROPHILS # BLD AUTO: 6.3 X10E3/UL (ref 1.4–7)
NEUTROPHILS NFR BLD AUTO: 79 %
PLATELET # BLD AUTO: 331 X10E3/UL (ref 150–450)
POTASSIUM SERPL-SCNC: 4.8 MMOL/L (ref 3.5–5.2)
PROT SERPL-MCNC: 6.3 G/DL (ref 6–8.5)
RBC # BLD AUTO: 4.91 X10E6/UL (ref 3.77–5.28)
SODIUM SERPL-SCNC: 142 MMOL/L (ref 134–144)
TRIGL SERPL-MCNC: 98 MG/DL (ref 0–149)
TSH SERPL DL<=0.005 MIU/L-ACNC: 12.7 UIU/ML (ref 0.45–4.5)
VLDLC SERPL CALC-MCNC: 18 MG/DL (ref 5–40)
WBC # BLD AUTO: 7.9 X10E3/UL (ref 3.4–10.8)

## 2023-03-13 ENCOUNTER — TELEPHONE (OUTPATIENT)
Dept: FAMILY MEDICINE CLINIC | Facility: CLINIC | Age: 63
End: 2023-03-13
Payer: MEDICAID

## 2023-03-14 RX ORDER — FLUTICASONE PROPIONATE 50 MCG
2 SPRAY, SUSPENSION (ML) NASAL DAILY
Qty: 16 G | Refills: 5 | Status: SHIPPED | OUTPATIENT
Start: 2023-03-14

## 2023-04-11 ENCOUNTER — OFFICE VISIT (OUTPATIENT)
Dept: FAMILY MEDICINE CLINIC | Facility: CLINIC | Age: 63
End: 2023-04-11
Payer: MEDICAID

## 2023-04-11 VITALS
HEART RATE: 77 BPM | SYSTOLIC BLOOD PRESSURE: 128 MMHG | HEIGHT: 69 IN | OXYGEN SATURATION: 97 % | BODY MASS INDEX: 35.44 KG/M2 | DIASTOLIC BLOOD PRESSURE: 84 MMHG

## 2023-04-11 DIAGNOSIS — J18.9 PNEUMONIA OF RIGHT MIDDLE LOBE DUE TO INFECTIOUS ORGANISM: ICD-10-CM

## 2023-04-11 DIAGNOSIS — J41.8 MIXED SIMPLE AND MUCOPURULENT CHRONIC BRONCHITIS: Primary | ICD-10-CM

## 2023-04-11 RX ORDER — GLYCOPYRROLATE AND FORMOTEROL FUMARATE 9; 4.8 UG/1; UG/1
1 AEROSOL, METERED RESPIRATORY (INHALATION) DAILY
Qty: 10.7 G | Refills: 5 | Status: SHIPPED | OUTPATIENT
Start: 2023-04-11

## 2023-04-11 NOTE — ASSESSMENT & PLAN NOTE
COPD is progressively worsening, patient continues to smoke, strongly encouraged to quit.  I am adding Bevespi to her regimen and I've suggested we send her to pulmonology, she will need to see Dr. Magdaleno here is town.

## 2023-04-11 NOTE — ASSESSMENT & PLAN NOTE
She will need a repeat CXR to make sure this has cleared up, she will return next month for that.   Today her lungs sound clear and her breathing is back to baseline.

## 2023-04-11 NOTE — PROGRESS NOTES
"Chief Complaint  Pneumonia (Patient is here for a follow up)       Subjective          Anastasiia Avilez presents to [facility]  History of Present Illness she is here today for follow-up on her pneumonia.  We treated her at her last visit with antibiotics however her symptoms worsened  and she presented to the emergency room where they saw the pneumonia and opted to hospitalize her for a few days for IV antibiotics.  She was then sent home on oral antibiotics and she has completed all of those and is feeling much better now.  She does report that she and her  are currently homeless and living out of their truck but she is taking her medications daily.  She is not however able to use her CPAP at night.      Objective   Vital Signs:   /84 (BP Location: Right arm)   Pulse 77   Ht 175.3 cm (69\")   SpO2 97%   BMI 35.44 kg/m²     Body mass index is 35.44 kg/m².    Review of Systems   Constitutional: Negative for chills, fatigue and fever.   HENT: Negative for congestion, postnasal drip, rhinorrhea and sneezing.    Respiratory: Positive for shortness of breath (chronic, back to her baseline). Negative for cough, chest tightness and wheezing.    Cardiovascular: Negative for chest pain and palpitations.   Musculoskeletal: Negative for back pain and myalgias.   Neurological: Negative for dizziness and headache.   Psychiatric/Behavioral: Negative for depressed mood. The patient is not nervous/anxious.        Past History:  Medical History: has a past medical history of Multilevel degenerative disc disease.   Surgical History: has no past surgical history on file.   Family History: family history is not on file.   Social History: reports that she has been smoking cigarettes. She has a 40.00 pack-year smoking history. She has never used smokeless tobacco. She reports that she does not drink alcohol and does not use drugs.      Current Outpatient Medications:   •  albuterol sulfate HFA " 108 (90 Base) MCG/ACT inhaler, Inhale 2 puffs Every 4 (Four) Hours As Needed for Wheezing., Disp: 18 g, Rfl: 12  •  diclofenac (VOLTAREN) 75 MG EC tablet, TAKE ONE TABLET BY MOUTH 2 TIMES A DAY, Disp: 60 tablet, Rfl: 2  •  docusate sodium 100 MG capsule, docusate sodium 100 mg capsule, Disp: , Rfl:   •  FLUoxetine (PROzac) 40 MG capsule, Take 1 capsule by mouth Daily., Disp: 90 capsule, Rfl: 1  •  fluticasone (FLONASE) 50 MCG/ACT nasal spray, 2 sprays into the nostril(s) as directed by provider Daily., Disp: 16 g, Rfl: 5  •  Fluticasone Furoate-Vilanterol (Breo Ellipta) 100-25 MCG/ACT aerosol powder , Inhale 1 puff Daily., Disp: 1 each, Rfl: 12  •  furosemide (LASIX) 40 MG tablet, Take 1 tablet by mouth Daily., Disp: 90 tablet, Rfl: 1  •  HYDROcodone-acetaminophen (NORCO) 5-325 MG per tablet, , Disp: , Rfl:   •  hydrOXYzine (ATARAX) 25 MG tablet, Take 1 tablet by mouth 3 (Three) Times a Day., Disp: 90 tablet, Rfl: 3  •  levothyroxine (SYNTHROID, LEVOTHROID) 75 MCG tablet, Take 1 tablet by mouth Daily., Disp: 90 tablet, Rfl: 1  •  metoclopramide (REGLAN) 10 MG tablet, Take 0.5 tablets by mouth 4 (Four) Times a Day., Disp: 120 tablet, Rfl: 5  •  montelukast (SINGULAIR) 10 MG tablet, Take 1 tablet by mouth Every Night., Disp: 90 tablet, Rfl: 3  •  oxybutynin (DITROPAN) 5 MG tablet, Take 1 tablet by mouth 2 (Two) Times a Day., Disp: 60 tablet, Rfl: 5  •  pantoprazole (PROTONIX) 40 MG EC tablet, Take 1 tablet by mouth Daily., Disp: 90 tablet, Rfl: 1  •  promethazine (PHENERGAN) 25 MG tablet, Take 1 tablet by mouth Every 6 (Six) Hours As Needed for Nausea or Vomiting., Disp: 30 tablet, Rfl: 1  •  SSD 1 % cream, , Disp: , Rfl:   •  tiZANidine (ZANAFLEX) 4 MG tablet, TAKE ONE TABLET BY MOUTH 3 TIMES A DAY, Disp: 30 tablet, Rfl: 0  •  traZODone (DESYREL) 150 MG tablet, Take 1 or 2 every bedtime for sleep, Disp: 60 tablet, Rfl: 5  •  gabapentin (NEURONTIN) 800 MG tablet, Take 1 tablet by mouth 3 (Three) Times a Day for 90  days., Disp: 90 tablet, Rfl: 5  •  Glycopyrrolate-Formoterol (Bevespi Aerosphere) 9-4.8 MCG/ACT aerosol, Inhale 1 spray Daily., Disp: 10.7 g, Rfl: 5    Allergies: Sulfa antibiotics and Adhesive tape    Physical Exam  Constitutional:       Appearance: She is obese.   HENT:      Right Ear: Tympanic membrane, ear canal and external ear normal.      Left Ear: Tympanic membrane, ear canal and external ear normal.   Cardiovascular:      Rate and Rhythm: Normal rate and regular rhythm.      Comments: Distant heart sounds.   Pulmonary:      Effort: Pulmonary effort is normal.      Breath sounds: Decreased air movement present. No wheezing or rhonchi.   Neurological:      Mental Status: She is alert and oriented to person, place, and time.   Psychiatric:         Mood and Affect: Mood normal.         Behavior: Behavior normal.          Result Review :                   Assessment and Plan    Diagnoses and all orders for this visit:    1. Mixed simple and mucopurulent chronic bronchitis (Primary)  Assessment & Plan:  COPD is progressively worsening, patient continues to smoke, strongly encouraged to quit.  I am adding Bevespi to her regimen and I've suggested we send her to pulmonology, she will need to see Dr. Magdaleno here is [facility].       Orders:  -     Ambulatory Referral to Pulmonology    2. Pneumonia of right middle lobe due to infectious organism  Assessment & Plan:  She will need a repeat CXR to make sure this has cleared up, she will return next month for that.   Today her lungs sound clear and her breathing is back to baseline.       Other orders  -     Glycopyrrolate-Formoterol (Bevespi Aerosphere) 9-4.8 MCG/ACT aerosol; Inhale 1 spray Daily.  Dispense: 10.7 g; Refill: 5      Follow Up   Return in about 4 weeks (around 5/9/2023) for Recheck.  Patient was given instructions and counseling regarding her condition or for health maintenance advice. Please see specific information pulled into the AVS if appropriate.      Radha Means, PA-C

## 2023-05-04 RX ORDER — DICLOFENAC SODIUM 75 MG/1
TABLET, DELAYED RELEASE ORAL
Qty: 60 TABLET | Refills: 0 | Status: SHIPPED | OUTPATIENT
Start: 2023-05-04

## 2023-05-09 ENCOUNTER — OFFICE VISIT (OUTPATIENT)
Dept: FAMILY MEDICINE CLINIC | Facility: CLINIC | Age: 63
End: 2023-05-09
Payer: MEDICAID

## 2023-05-09 VITALS
SYSTOLIC BLOOD PRESSURE: 140 MMHG | DIASTOLIC BLOOD PRESSURE: 90 MMHG | OXYGEN SATURATION: 96 % | BODY MASS INDEX: 35.44 KG/M2 | HEART RATE: 85 BPM | HEIGHT: 69 IN

## 2023-05-09 DIAGNOSIS — J90 PLEURAL EFFUSION: ICD-10-CM

## 2023-05-09 DIAGNOSIS — G89.4 CHRONIC PAIN SYNDROME: ICD-10-CM

## 2023-05-09 DIAGNOSIS — G47.33 OBSTRUCTIVE SLEEP APNEA SYNDROME: ICD-10-CM

## 2023-05-09 DIAGNOSIS — E66.01 MORBID OBESITY: ICD-10-CM

## 2023-05-09 DIAGNOSIS — J18.9 PNEUMONIA OF RIGHT MIDDLE LOBE DUE TO INFECTIOUS ORGANISM: ICD-10-CM

## 2023-05-09 DIAGNOSIS — F17.200 SMOKER: ICD-10-CM

## 2023-05-09 DIAGNOSIS — Z59.00 HOMELESSNESS: ICD-10-CM

## 2023-05-09 DIAGNOSIS — R06.02 SHORTNESS OF BREATH: Primary | ICD-10-CM

## 2023-05-09 DIAGNOSIS — M53.9 MULTILEVEL DEGENERATIVE DISC DISEASE: ICD-10-CM

## 2023-05-09 DIAGNOSIS — J41.8 MIXED SIMPLE AND MUCOPURULENT CHRONIC BRONCHITIS: ICD-10-CM

## 2023-05-09 DIAGNOSIS — R09.89 SUSPECTED CHF (CONGESTIVE HEART FAILURE): ICD-10-CM

## 2023-05-09 SDOH — ECONOMIC STABILITY - HOUSING INSECURITY: HOMELESSNESS UNSPECIFIED: Z59.00

## 2023-05-09 NOTE — ASSESSMENT & PLAN NOTE
Patient's (Body mass index is 35.44 kg/m².) indicates that they are morbidly/severely obese (BMI > 40 or > 35 with obesity - related health condition) with health conditions that include obstructive sleep apnea, hypertension, GERD, peripheral vascular disease, osteoarthritis and lower extremity venous stasis disease . Weight is unchanged. BMI  is above average; BMI management plan is completed. We discussed low calorie, low carb based diet program, portion control and increasing exercise.

## 2023-05-09 NOTE — ASSESSMENT & PLAN NOTE
Unable to use the CPAP at this time because she is homeless.  Patient would certainly benefit from CPAP at night.

## 2023-05-09 NOTE — ASSESSMENT & PLAN NOTE
Chronic, patient is seeing pulmonology and I am setting her up to see cardiology as well.  She did not get the Breo, apparently her insurance will not cover it. I sent in Bevespi but she did not get that either.  She is using only albuterol.  She will try to  the Bevespi today.

## 2023-05-09 NOTE — ASSESSMENT & PLAN NOTE
COPD is unchanged.  Discussed monitoring symptoms and use of quick-relief medications and contacting us early in the course of exacerbations.  Warning signs of respiratory distress were reviewed with the patient.   Continue current medications.  Keep follow up with pulmonology.

## 2023-05-09 NOTE — ASSESSMENT & PLAN NOTE
Limits her mobility.  Patient requested RX for wheelchair.  She has an appt with pain management in [place] later this month to have this addressed.

## 2023-05-09 NOTE — PROGRESS NOTES
"Chief Complaint  Bronchitis (Patient is here for a follow up)    Subjective          Anastasiia Avilez presents to [facility]  History of Present Illness patient is here for a follow-up after having been diagnosed with bronchitis and prior to that in the ER she been diagnosed with pneumonia and needs a follow-up chest x-ray.  She tells me that she did see Dr. Magdaleno and he would like to put her on oxygen but until she has a home that is not possible, currently she is homeless and sleeping in the car with her .  She is chronically short of breath but reports that she is no longer wheezing.  She has multiple underlying health issues including suspected CHF, morbid obesity, degenerative disc disease with chronic pain, COPD and obstructive sleep apnea all of which are complicated by her homeless condition.    Objective   Vital Signs:   /90 (BP Location: Left arm)   Pulse 85   Ht 175.3 cm (69\")   SpO2 96%   BMI 35.44 kg/m²     Body mass index is 35.44 kg/m².    Review of Systems   Constitutional: Negative for chills, fatigue and fever.   HENT: Negative.    Respiratory: Positive for shortness of breath. Negative for cough, chest tightness and wheezing.    Cardiovascular: Positive for leg swelling. Negative for chest pain and palpitations.   Musculoskeletal: Negative for back pain and myalgias.   Neurological: Negative for dizziness and headache.   Psychiatric/Behavioral: Negative for depressed mood. The patient is not nervous/anxious.        Past History:  Medical History: has a past medical history of Multilevel degenerative disc disease.   Surgical History: has no past surgical history on file.   Family History: family history is not on file.   Social History: reports that she has been smoking cigarettes. She has a 40.00 pack-year smoking history. She has never used smokeless tobacco. She reports that she does not drink alcohol and does not use drugs.      Current Outpatient " Medications:   •  albuterol sulfate  (90 Base) MCG/ACT inhaler, Inhale 2 puffs Every 4 (Four) Hours As Needed for Wheezing., Disp: 18 g, Rfl: 12  •  diclofenac (VOLTAREN) 75 MG EC tablet, TAKE ONE TABLET BY MOUTH 2 TIMES A DAY, Disp: 60 tablet, Rfl: 0  •  docusate sodium 100 MG capsule, docusate sodium 100 mg capsule, Disp: , Rfl:   •  FLUoxetine (PROzac) 40 MG capsule, Take 1 capsule by mouth Daily., Disp: 90 capsule, Rfl: 1  •  fluticasone (FLONASE) 50 MCG/ACT nasal spray, 2 sprays into the nostril(s) as directed by provider Daily., Disp: 16 g, Rfl: 5  •  furosemide (LASIX) 40 MG tablet, Take 1 tablet by mouth Daily., Disp: 90 tablet, Rfl: 1  •  Glycopyrrolate-Formoterol (Bevespi Aerosphere) 9-4.8 MCG/ACT aerosol, Inhale 1 spray Daily., Disp: 10.7 g, Rfl: 5  •  HYDROcodone-acetaminophen (NORCO) 5-325 MG per tablet, , Disp: , Rfl:   •  hydrOXYzine (ATARAX) 25 MG tablet, Take 1 tablet by mouth 3 (Three) Times a Day., Disp: 90 tablet, Rfl: 3  •  levothyroxine (SYNTHROID, LEVOTHROID) 75 MCG tablet, Take 1 tablet by mouth Daily., Disp: 90 tablet, Rfl: 1  •  metoclopramide (REGLAN) 10 MG tablet, Take 0.5 tablets by mouth 4 (Four) Times a Day., Disp: 120 tablet, Rfl: 5  •  montelukast (SINGULAIR) 10 MG tablet, Take 1 tablet by mouth Every Night., Disp: 90 tablet, Rfl: 3  •  oxybutynin (DITROPAN) 5 MG tablet, Take 1 tablet by mouth 2 (Two) Times a Day., Disp: 60 tablet, Rfl: 5  •  pantoprazole (PROTONIX) 40 MG EC tablet, Take 1 tablet by mouth Daily., Disp: 90 tablet, Rfl: 1  •  promethazine (PHENERGAN) 25 MG tablet, Take 1 tablet by mouth Every 6 (Six) Hours As Needed for Nausea or Vomiting., Disp: 30 tablet, Rfl: 1  •  SSD 1 % cream, , Disp: , Rfl:   •  tiZANidine (ZANAFLEX) 4 MG tablet, TAKE ONE TABLET BY MOUTH 3 TIMES A DAY, Disp: 30 tablet, Rfl: 0  •  traZODone (DESYREL) 150 MG tablet, Take 1 or 2 every bedtime for sleep, Disp: 60 tablet, Rfl: 5  •  gabapentin (NEURONTIN) 800 MG tablet, Take 1 tablet by mouth  3 (Three) Times a Day for 90 days., Disp: 90 tablet, Rfl: 5    Allergies: Sulfa antibiotics and Adhesive tape    Physical Exam  Constitutional:       Appearance: She is obese.   Cardiovascular:      Rate and Rhythm: Normal rate and regular rhythm.      Heart sounds: Normal heart sounds.   Pulmonary:      Effort: Pulmonary effort is normal.      Breath sounds: No wheezing, rhonchi or rales.   Neurological:      Mental Status: She is alert and oriented to person, place, and time.   Psychiatric:         Mood and Affect: Mood normal.         Behavior: Behavior normal.          Result Review :                   Assessment and Plan    Diagnoses and all orders for this visit:    1. Shortness of breath (Primary)  Assessment & Plan:  Chronic, patient is seeing pulmonology and I am setting her up to see cardiology as well.  She did not get the Breo, apparently her insurance will not cover it. I sent in Bevespi but she did not get that either.  She is using only albuterol.  She will try to  the Bevespi today.     Orders:  -     XR Chest PA & Lateral; Future    2. Suspected CHF (congestive heart failure)  Assessment & Plan:  I am going to arrange referral to cardiology for further evaluation.    Orders:  -     Ambulatory Referral to Cardiology    3. Mixed simple and mucopurulent chronic bronchitis  Assessment & Plan:  COPD is unchanged.  Discussed monitoring symptoms and use of quick-relief medications and contacting us early in the course of exacerbations.  Warning signs of respiratory distress were reviewed with the patient.   Continue current medications.  Keep follow up with pulmonology.           4. Obstructive sleep apnea syndrome  Assessment & Plan:  Unable to use the CPAP at this time because she is homeless.  Patient would certainly benefit from CPAP at night.      5. Morbid obesity  Assessment & Plan:  Patient's (Body mass index is 35.44 kg/m².) indicates that they are morbidly/severely obese (BMI > 40 or > 35  with obesity - related health condition) with health conditions that include obstructive sleep apnea, hypertension, GERD, peripheral vascular disease, osteoarthritis and lower extremity venous stasis disease . Weight is unchanged. BMI  is above average; BMI management plan is completed. We discussed low calorie, low carb based diet program, portion control and increasing exercise.       6. Multilevel degenerative disc disease  Assessment & Plan:  Limits her mobility.  Patient requested RX for wheelchair.  She has an appt with pain management in [place] later this month to have this addressed.       7. Chronic pain syndrome    8. Pleural effusion  Assessment & Plan:  Repeating CXR today to look for resolution of this.       9. Pneumonia of right middle lobe due to infectious organism  Assessment & Plan:  Repeating CXR to look for resolution of this.       10. Homelessness  Assessment & Plan:  Currently patient and her spouse are sleeping in their vehicle but they are working with someone in housing.      11. Smoker  Assessment & Plan:  Patient continues to smoke despite being strongly encouraged to quit.        Follow Up   Return in about 6 weeks (around 6/20/2023) for Recheck.  Patient was given instructions and counseling regarding her condition or for health maintenance advice. Please see specific information pulled into the AVS if appropriate.     Radha Valladares PA-C

## 2023-05-09 NOTE — ASSESSMENT & PLAN NOTE
Currently patient and her spouse are sleeping in their vehicle but they are working with someone in housing.

## 2023-05-10 ENCOUNTER — TELEPHONE (OUTPATIENT)
Dept: FAMILY MEDICINE CLINIC | Facility: CLINIC | Age: 63
End: 2023-05-10
Payer: MEDICAID

## 2023-05-10 NOTE — TELEPHONE ENCOUNTER
Tried to call Pt and Unable to reach Pt or leave VM, if Pt calls back please let her know---No pneumonia seen her her CXR, she does have chronic changes consistent with COPD and enlarged heart which is why I'm sending her to see the cardiologist. Ok for HUB to read

## 2023-05-11 ENCOUNTER — TELEPHONE (OUTPATIENT)
Dept: FAMILY MEDICINE CLINIC | Facility: CLINIC | Age: 63
End: 2023-05-11
Payer: MEDICAID

## 2023-05-11 ENCOUNTER — OFFICE VISIT (OUTPATIENT)
Dept: CARDIOLOGY | Facility: CLINIC | Age: 63
End: 2023-05-11
Payer: MEDICAID

## 2023-05-11 VITALS
HEART RATE: 94 BPM | BODY MASS INDEX: 43.4 KG/M2 | HEIGHT: 69 IN | RESPIRATION RATE: 18 BRPM | OXYGEN SATURATION: 94 % | SYSTOLIC BLOOD PRESSURE: 140 MMHG | DIASTOLIC BLOOD PRESSURE: 82 MMHG | WEIGHT: 293 LBS

## 2023-05-11 DIAGNOSIS — R06.02 SHORTNESS OF BREATH: ICD-10-CM

## 2023-05-11 DIAGNOSIS — F17.200 SMOKER: Primary | ICD-10-CM

## 2023-05-11 DIAGNOSIS — R60.9 PERIPHERAL EDEMA: ICD-10-CM

## 2023-05-11 DIAGNOSIS — J41.8 MIXED SIMPLE AND MUCOPURULENT CHRONIC BRONCHITIS: ICD-10-CM

## 2023-05-11 RX ORDER — SPIRONOLACTONE 25 MG/1
25 TABLET ORAL DAILY
Qty: 90 TABLET | Refills: 3 | Status: SHIPPED | OUTPATIENT
Start: 2023-05-11

## 2023-05-11 RX ORDER — PROMETHAZINE HYDROCHLORIDE 25 MG/1
25 TABLET ORAL EVERY 6 HOURS PRN
Qty: 30 TABLET | Refills: 3 | Status: SHIPPED | OUTPATIENT
Start: 2023-05-11

## 2023-05-11 NOTE — TELEPHONE ENCOUNTER
Caller: Anastasiia Avilez    Relationship: Self    Best call back number:  [phone]    Requested Prescriptions:   Requested Prescriptions      No prescriptions requested or ordered in this encounter      promethazine (PHENERGAN) 25 MG tablet    Pharmacy where request should be sent:      [address] [facility] [phone] [facility] [phone]      Last office visit with prescribing clinician: 5/9/2023   Last telemedicine visit with prescribing clinician: 5/10/2023   Next office visit with prescribing clinician: 6/20/2023       Does the patient have less than a 3 day supply:    [x] Yes  [] No    Would you like a call back once the refill request has been completed: [] Yes [x] No    If the office needs to give you a call back, can they leave a voicemail: [] Yes [x] No    Ann Marie Purcell, PCT   05/11/23 12:37 EDT

## 2023-05-11 NOTE — PROGRESS NOTES
[facility] [place]  [facility]IOLOG[facility]  [address] [place] [address]  Dept: [phone]  Dept Fax: [phone]    Anastasiia Avilez  1960    New Patient Office Note    History of Present Illness:  Anastasiia Avilez is a 63 y.o. female who presents to the clinic for Establish Care. For Enlarged heart- she is a large 63 years old, tobacco abuser with severe COPD, and also fatty liver cirrhosis, was referred from PCP office for enlarged heart on Cxr, she does have SOB on exertion, mild exertion and also edema,  She has been multiples times in Er for COPD exacerbation and also pneumonia, has had 2 echo with limited results but normal EF and normal heart, she did have mild diastolic dysfunction, but no ANEL, her BNP has been always normal, she  also has had a normal heart size by Ct chest and mild calcifications of the coronary arteries, BP is 120.80, she denies any chest pain, , she is still smoking, she sees also a lung doctor, she has never being tested for ESTEVAN and she might have , cardiac exam seems normal except 2-3 plus edema, EKG sinus rhythm with low voltage, HR 90, she takes Lasix 40 mg daily and will add Aldactone 25 mg, I think her SOB is likely related to respiratory issues severe COPD with co2 retention, obesity and likely ESTEVAN.,     The following portions of the patient's history were reviewed and updated as appropriate: allergies, current medications, past family history, past medical history, past social history, past surgical history, and problem list.    Medications:  albuterol sulfate HFA  Bevespi Aerosphere aerosol  diclofenac  FLUoxetine  fluticasone  furosemide  HYDROcodone-acetaminophen  hydrOXYzine  levothyroxine  metoclopramide  montelukast  oxybutynin  pantoprazole  promethazine  spironolactone  tiZANidine  traZODone    Subjective  Allergies   Allergen Reactions   • Sulfa Antibiotics Rash, Itching, GI Intolerance and Hives   • Adhesive Tape Rash        Past Medical  "History:   Diagnosis Date   • Multilevel degenerative disc disease        History reviewed. No pertinent surgical history.    History reviewed. No pertinent family history.     Social History     Socioeconomic History   • Marital status: Single   Tobacco Use   • Smoking status: Every Day     Packs/day: 1.00     Years: 40.00     Pack years: 40.00     Types: Cigarettes   • Smokeless tobacco: Never   Vaping Use   • Vaping Use: Never used   Substance and Sexual Activity   • Alcohol use: Never   • Drug use: Never   • Sexual activity: Defer       Review of Systems   Constitutional: Negative.    HENT: Negative.    Respiratory: Positive for shortness of breath.    Cardiovascular: Positive for leg swelling.   Endocrine: Negative.    Genitourinary: Negative.    Musculoskeletal: Negative.    Skin: Negative.    Allergic/Immunologic: Negative.    Neurological: Negative.    Hematological: Negative.    Psychiatric/Behavioral: Negative.        Cardiovascular Procedures    ECHO/MUGA:  STRESS TESTS:   CARDIAC CATH:   DEVICES:   HOLTER:   CT/MRI:   VASCULAR:   CARDIOTHORACIC:     Objective  Vitals:    05/11/23 1417   BP: 140/82   BP Location: Right arm   Patient Position: Sitting   Cuff Size: Large Adult   Pulse: 94   Resp: 18   SpO2: 94%   Weight: (!) 156 kg (345 lb)   Height: 175.3 cm (69\")   PainSc: 10-Worst pain ever   PainLoc: Back       Physical Exam  Vitals reviewed.   Constitutional:       Appearance: Healthy appearance. Not in distress.   Neck:      Vascular: No JVR. JVD normal.   Pulmonary:      Effort: Pulmonary effort is normal.      Breath sounds: Normal breath sounds. No wheezing. No rhonchi. No rales.   Chest:      Chest wall: Not tender to palpatation.   Cardiovascular:      PMI at left midclavicular line. Normal rate. Regular rhythm. Normal S1. Normal S2.      Murmurs: There is no murmur.      No gallop. No click. No rub.   Pulses:     Intact distal pulses.   Edema:     Thigh: bilateral 2+ edema of the thigh.     " Pretibial: bilateral 2+ edema of the pretibial area.     Ankle: bilateral 2+ edema of the ankle.     Feet: bilateral 2+ edema of the feet.  Abdominal:      General: Bowel sounds are normal.      Palpations: Abdomen is soft.      Tenderness: There is no abdominal tenderness.   Musculoskeletal: Normal range of motion.         General: No tenderness. Skin:     General: Skin is warm and dry.   Neurological:      General: No focal deficit present.      Mental Status: Alert and oriented to person, place and time.          Diagnostic Data    ECG 12 Lead    Date/Time: 5/11/2023 3:47 PM  Performed by: Flex Steinberg MD  Authorized by: Flex Steinberg MD   Comparison: compared with previous ECG from 3/12/2023  Similar to previous ECG  Rhythm: sinus rhythm  Rate: normal  BPM: 90  QRS axis: normal    Clinical impression: normal ECG            Assessment and Plan  Diagnoses and all orders for this visit:    Smoker- Advised to quit     Shortness of breath- Likely from COPD    Peripheral edema- Also multifactorial ESTEVAN, COPD and likely diastolic heart failure although BNP  Normal and echo normal EF  With just abnormal relaxation pattern,  No heart enlargement were seen by CT lungs and also echo.  COPD- per PCP and lung doctor, PFt has shown restrictive pattern  Obesity BMI 50- was advised to lose weight    Other orders  -     spironolactone (ALDACTONE) 25 MG tablet; Take 1 tablet by mouth Daily.        Return in about 1 month (around 6/11/2023) for Recheck with Dr. Steinberg.    Flex Steinberg MD  05/11/2023

## 2023-05-17 ENCOUNTER — PATIENT OUTREACH (OUTPATIENT)
Dept: CASE MANAGEMENT | Facility: OTHER | Age: 63
End: 2023-05-17
Payer: MEDICAID

## 2023-05-17 DIAGNOSIS — N18.31 STAGE 3A CHRONIC KIDNEY DISEASE: ICD-10-CM

## 2023-05-17 DIAGNOSIS — J41.8 MIXED SIMPLE AND MUCOPURULENT CHRONIC BRONCHITIS: ICD-10-CM

## 2023-05-17 DIAGNOSIS — Z59.00 HOMELESSNESS: Primary | ICD-10-CM

## 2023-05-17 SDOH — ECONOMIC STABILITY - HOUSING INSECURITY: HOMELESSNESS UNSPECIFIED: Z59.00

## 2023-05-17 NOTE — OUTREACH NOTE
"AMBULATORY CASE MANAGEMENT NOTE    Name and Relationship of Patient/Support Person: Anastasiia Avilez - Self    CCM Interim Update  Spoke with patient and explained CCM services. Verbal consent given by patient. Patient was discharged from the hospital today [facility]. Hospital records obtained to be scanned into patient's chart. Patient is currently homeless and living in vehicle with her boyfriend.  She states boyfriend is wonderful and supportive.  He runs errands for them and assists her as needed.  It was recommended during past 2 hospitalizations that patient go to rehab.  In march patient left [facility] AMA. This hospitalization she states that she did not qualify for rehab.  [facility] documentation supports that she declined rehab. They are on waiting list through [facility] for section 8 housing.  Anastasiia states they have been on that list since April.  This is compounded by that patient will need first floor housing.     Patient states that they go to soup kitchen daily.  Sometimes they are given a bag meal for later in the day.  She does get food stamps but exhausts those stamps prior to the next month.  SW referral placed. Patient states that they became homeless in March.   their room mate  and shortly thereafter his girlfriend kicked them out. They did attempt to stay in a hotel. They stayed for 7 days and it cost $450.  They can not afford this.     The hospital helped them to obtain a portable concentrator that can be powered with cigarette lighter of the car. She states that she is using. Currently she and boyfriend were picking up wheelchair for patient. It has been recommended that patient have overnight sleep study.  Patient declines this.  When asked her reason to not have study patient states, \"I just don't want to do that.\"    Call was ended more quickly due to boyfriend becoming very inpatient and raising voice.       Education Documentation  No documentation found.        [place] " E  Ambulatory Case Management    5/17/2023, 11:40 EDT

## 2023-05-22 ENCOUNTER — PATIENT OUTREACH (OUTPATIENT)
Dept: CASE MANAGEMENT | Facility: OTHER | Age: 63
End: 2023-05-22
Payer: MEDICAID

## 2023-05-22 NOTE — OUTREACH NOTE
Social Work Assessment  Questions/Answers    Flowsheet Row Most Recent Value   Referral Source nursing   Reason for Consult community resources, financial concerns, housing concerns/homeless, other (see comments)  [food resources]   Preferred Language English   Advance Care Planning Reviewed no concerns identified   People in Home spouse   Current Living Arrangements homeless   Primary Care Provided by self   Quality of Family Relationships helpful, involved, supportive   Source of Income unable to assess   Financial/Environmental Concerns unable to afford food   Application for Public Assistance applied   Medications independent   Meal Preparation independent   Housekeeping independent   Laundry independent   Shopping independent   Major Change/Loss/Stressor financial, housing concerns   Patient Personal Strengths expressive of needs   Sources of Support community support, significant other   Patient/Family Anticipates Transition to shelter   Patient/Family Anticipated Services at Transition    Transportation Anticipated car, drives self   Current Discharge Risk homeless        SDOH updated and reviewed with the patient during this program:  Financial Resource Strain: High Risk   • Difficulty of Paying Living Expenses: Very hard      Food Insecurity: Food Insecurity Present   • Worried About Running Out of Food in the Last Year: Often true   • Ran Out of Food in the Last Year: Often true      Transportation Needs: No Transportation Needs   • Lack of Transportation (Medical): No   • Lack of Transportation (Non-Medical): No      Housing Stability: High Risk   • Unable to Pay for Housing in the Last Year: Yes   • Number of Places Lived in the Last Year: 2   • Unstable Housing in the Last Year: Yes      Continuing Care   [facility]   [facility] - [address] [address]    Phone: [phone]    Resource for: Food Insecurity   [place] [employer] [facility]     [address] [facility] [place] [address]    Phone: [phone]    Resource for: Housing Stability   [place] [facility]    [address], [place] [address]    Phone: [phone]    Resource for: Food Insecurity     Patient Outreach    MSW outreach to patient to discuss community resources at request of RN-ACM. Patient states that she and her significant other are currently homeless. MSW and patient discussed homeless shelters in [place] and patient prefers to stay with spouse. Patient and MSW discussed [facility] for assistance with shelter, day program, and food assistance. Patient to call [facility] to inquire on how to get connected with  and other services with [employer] [facility] and MSW provided contact information. MSW and patient discussed food pantries in [place], and patient provided contact information. Patient and significant other are already receiving Supplemental Nutritional Assistance Program (SNAP) benefits and are on the wait list for Section 8 Housing. No other needs at this time and patient to call back to MSW as needed.     Lindsay CASTILLO -   Ambulatory Case Management    5/22/2023, 10:07 EDT

## 2023-05-31 ENCOUNTER — PATIENT OUTREACH (OUTPATIENT)
Dept: CASE MANAGEMENT | Facility: OTHER | Age: 63
End: 2023-05-31

## 2023-05-31 DIAGNOSIS — N18.31 STAGE 3A CHRONIC KIDNEY DISEASE: ICD-10-CM

## 2023-05-31 DIAGNOSIS — G89.4 CHRONIC PAIN SYNDROME: ICD-10-CM

## 2023-05-31 DIAGNOSIS — J41.8 MIXED SIMPLE AND MUCOPURULENT CHRONIC BRONCHITIS: Primary | ICD-10-CM

## 2023-05-31 NOTE — OUTREACH NOTE
[facility] End of Month Documentation    This Chronic Medical Management Care Plan for Anastasiia Avilez, 63 y.o. female, has been monitored and managed; a new plan of care implemented; established and a new plan of care implemented for the month of May.  A cumulative time of 40  minutes was spent on this patient record this month, including phone call with patient; chart review.    Regarding the patient's problems: has Shortness of breath; Multilevel degenerative disc disease; Stage 3a chronic kidney disease; Lumbar radiculopathy; Lumbosacral spondylosis without myelopathy; Anxiety and depression; Chronic obstructive pulmonary disease; Chronic pain; Gastroesophageal reflux disease; Hilar lymphadenopathy; Morbid obesity; Narcotic drug use; Obstructive sleep apnea syndrome; Hypothyroidism; Peripheral edema; Pleural effusion; Poor historian; Smoker; Suspected CHF (congestive heart failure); Unspecified cirrhosis of liver; Overactive bladder; Pneumonia due to infectious organism; and Homelessness on their problem list., the following items were addressed: medical records; medications; changes to medical care; referrals to community service providers, [facility] and any changes can be found within the plan section of the note.  A detailed listing of time spent for chronic care management is tracked within each outreach encounter.  Current medications include:  has a current medication list which includes the following prescription(s): albuterol sulfate hfa, diclofenac, fluoxetine, fluticasone, furosemide, gabapentin, bevespi aerosphere, hydrocodone-acetaminophen, hydroxyzine, levothyroxine, metoclopramide, montelukast, oxybutynin, pantoprazole, promethazine, spironolactone, tizanidine, and trazodone. and the patient is reported to be patient is noncompliant with medication protocol,  Medications are reported to be effective.  Regarding these diagnoses, referrals were made to the following provider(s):  Social work.  All  notes on chart for PCP to review.    The patient was monitored remotely for blood pressure; weight; activity level; mood & behavior; pain; medications.    The patient's physical needs include:  needs assistance with ADLs; physical healthcare; resources for disability needs, social work referral.     The patient's mental support needs include:  increased support    The patient's cognitive support needs include:  increased support; coordination of community providers; needs assistance with ADLs    The patient's psychosocial support needs include:  need for increased support; no access to community activities    The patient's functional needs include: physical healthcare; needs assistance for ADLs; resources for disability needs    The patient's environmental needs include:  current living situation doesn't support interaction and privacy; inadequate living structure; no access to running water, patient is currently homeless. Lives in car with her boyfriend    Care Plan overall comments:  to assist patient in medical care and stabilize social situation    Refer to previous outreach notes for more information on the areas listed above.    Monthly Billing Diagnoses  (J41.8) Mixed simple and mucopurulent chronic bronchitis    (N18.31) Stage 3a chronic kidney disease    (G89.4) Chronic pain syndrome    Medications   · Medications have been reconciled    Care Plan progress this month:      Recently Modified Care Plans Updates made since 4/30/2023 12:00 AM     COPD (Adult)         Problem Priority Last Modified     Psychological Adjustment to Diagnosis (COPD) --  5/17/2023 11:28 AM by Meche Kenyon RN              Goal Recent Progress Last Modified     Adjustment to Disease Achieved --  5/17/2023 11:28 AM by Meche Kenyon RN     Evidence-based guidance:   Explore the patient and family's understanding of the disease; use open-ended questions to encourage patient and family to share what is important to them.  "  Assess and provide support around experience of loss and lifestyle adjustments, such as loss of function, roles, social ties, independence and hobbies.   Support adjustment to  €œnew normal\" with focus on maintaining daily life as closely as possible to life before chronic obstructive pulmonary disease (COPD) diagnosis.   Express empathy; listen actively by encouraging the patient and family to express feelings, concerns and fears; ask questions and encourage open communication regarding embarrassing or disturbing topics.   Assess for factors that may impact coping or adjustment, such as a preexisting mental health condition, prognosis, lack of social support, debilitating disease or financial difficulty that include no or insufficient insurance coverage.   Assess and address fear or anxiety related to dyspnea or perceived stigma of a noninfectious cough.   Prepare for re-entry into work, school and social activities; establish links or collaborate with mental health resources in the community, such as peer support or advocacy groups.   Assess anxiety, feelings of panic when breathing becomes labored, as well as impact on family/caregiver; consider cognitive behavioral therapy, deep breathing, meditation, visualization, photo or light therapy.   Encourage advanced care planning discussion to clarify goals of care; palliative care or hospice may be considered for advanced COPD patients if it aligns with patient's goals of care.   Explore common risk factors for depression, such as personal or family history of depression, substance use and stressors that include missed work, losing ability to do what patient was used to doing, changes in appearance, physical    or sexual abuse.   Destigmatize depression by presenting it as a problem requiring treatment, rather than a personal weakness.   Use a validated screening tool to identify level of suicide risk. If at risk, develop safety plan and implement additional safety " measures based on level of risk, such as behavioral health referral or immediate assessment.    Notes:            Task Due Date Last Modified     Support Psychosocial Response to Chronic Obstructive Pulmonary Disease --  5/17/2023 11:28 AM by Meche Kenyon RN     Care Management Activities:      - advanced care planning facilitated  - depression screen reviewed      Notes:            Problem Priority Last Modified     Disease Progression (COPD) --  5/17/2023 11:28 AM by Meche Kenyon RN              Goal Recent Progress Last Modified     Disease Progression Minimized or Managed --  5/17/2023 11:28 AM by Meche Kenyon RN     Evidence-based guidance:   Identify current smoking/tobacco use; provide smoking cessation intervention.   Assess symptom control by the frequency and type of symptoms, reliever use and activity limitation at every encounter.   Assess risk for exacerbation (flare up) by evaluating spirometry, pulse oximetry, reliever use, presentation of symptoms and activity limitation; anticipate treatment adjustment based on risks and resources.   Develop and/or review and reinforce use of COPD rescue (action) plan even when symptoms are controlled or infrequent.   Ask patient to bring inhaler to all visits; assess and reinforce correct technique; address barriers to proper inhaler use, such as older age, use of multiple devices and lack of understanding.    Identify symptom triggers, such as smoking, virus, weather change, emotional upset, exercise, obesity and environmental allergen; consider reduction of work-exposure versus elimination to avoid compromising employment.   Correlate presentation to comorbidity, such as diabetes, heart failure, obstructive sleep apnea, depression and anxiety, which may worsen symptoms.   Prepare for individualized pharmacologic therapy that may include LABA (long-acting beta-2 agonist), LAMA (long-acting muscarinic antagonist), [place] (short-acting beta-2  agonist) oral or inhaled corticosteroid.   Promote participation in pulmonary rehabilitation for breathing exercises, skills training, improved exercise capacity, mood and quality of life; address barriers to participation.   Promote physical activity or exercise to improve or maintain exercise capacity, based on tolerance that may include walking, water exercise, cycling or limb muscle strength training.   Promote use of energy conservation and activity pacing techniques.   Promote use of breathing and coughing techniques, such as inspiratory muscle training, pursed-lip breathing, diaphragmatic breathing, pranayama yoga breathing or vee cough.   Screen for malnutrition risk factors, such as unintentional weight loss and poor oral intake; refer to dietitian if identified.   Consider recommendation for oral drink supplement or multivitamin and mineral supplements if suspect inadequate oral intake or micronutrient deficiencies.    Screen for obstructive sleep apnea; prepare patient for polysomnography based on risk and presentation.   Prepare patient for use of long-term oxygen and noninvasive ventilation to relieve hypercapnia, hypoxemia, obstructive sleep apnea and reduce work of breathing.   Prepare patient with worsening disease for surgical interventions that may include bronchoscopy, lung volume reduction surgery, bullectomy or lung transplantation.    Notes:            Task Due Date Last Modified     Alleviate Barriers to COPD Management --  5/17/2023 11:28 AM by Meche Kenyon RN     Care Management Activities:      - activity or exercise based on tolerance encouraged      Notes:            Problem Priority Last Modified     Symptom Exacerbation (COPD) --  5/17/2023 11:28 AM by Meche Kenyon RN              Goal Recent Progress Last Modified     Symptom Exacerbation Prevented or Minimized --  5/17/2023 11:28 AM by Meche Kenyon RN     Evidence-based guidance:   Monitor for signs of respiratory  infection, including changes in sputum color, volume and thickness, as well as fever.   Encourage infection prevention strategies that may include prophylactic antibiotic therapy for patients with history of frequent exacerbations or antibiotic administration during exacerbation based on presentation, risk and benefit.   Encourage receipt of influenza and pneumococcal vaccine.   Prepare patient for use of home long-term oxygen therapy in presence of sever resting hypoxemia.   Prepare patients for laboratory studies or diagnostic exams, such as spirometry, pulse oximetry and arterial blood gas based on current symptoms, risk factors and presentation.   Assess barriers and manage adherence, including inhaler technique and persistent trigger exposure; encourage adherence, even when symptoms are controlled or infrequent.   Assess and monitor for signs/symptoms of psychosocial concerns, such as shortness of breath-anxiety cycle or depression that may impact stability of symptoms.   Identify economic resources, sociocultural beliefs, social factors and health literacy that may interfere with adherence.   Promote lifestyle changes when needed, including regular physical activity based on tolerance, weight loss, healthy eating and stress management.   Consider referral to nurse or community health worker or home-visiting program for intensive support and education (disease-management program).   Increase frequency of follow-up following exacerbation or hospitalization; consider transition of care interventions, such as hospital visit, home visit, telephone follow-up, review of discharge summary and resource referrals.    Notes:            Task Due Date Last Modified     Identify and Minimize Risk of COPD Exacerbation --  5/17/2023 11:28 AM by Meche Kenyon RN     Care Management Activities:      - barriers to lifestyle changes reviewed and addressed      Notes:               Obesity (Adult)         Problem Priority  Last Modified     Weight Management (Obesity) --  5/17/2023 11:28 AM by Meche Kenyon RN              Goal Recent Progress Last Modified     Weight Loss Achieved --  5/17/2023 11:28 AM by Meche Kenyon, RN     Evidence-based guidance:   Review medication that may contribute to weight gain, such as corticosteroid, beta-blocker, tricyclic antidepressant, oral antihyperglycemic; advocate for changes when appropriate.   Perform or refer to registered dietitian to perform comprehensive nutrition assessment that includes disordered-eating behaviors, such as binge-eating, emotional or compulsive eating, grazing.    patient regarding health risks of obesity and that weight loss goal of 5 to 10 percent of initial weight will improve risk.   Recommend initial weight loss goal of 3 to 5 percent of bodyweight; increase weight-loss goals based on patient success as achieving greater weight loss continues to reduce risk.   Propose a calorie-reduced diet based on the patient's preferences and health status.   Provide ongoing emotional support or cognitive behavioral therapy and dietitian services (individual, group, virtual) over at least 6 months with a minimum of 14 encounters to best facilitate weight loss.   Provide monthly follow-up for 12 months when weight loss goal is met to assist with maintenance of weight loss.   Encourage increased physical activity or exercise based on individual age, risk, and ability up to 200 to 300 minutes per week that includes aerobic and resistance training.   Encourage reduction in sedentary behaviors by replacing them with nonexercise yet active leisure pursuits.   Identify physical barriers, such as change in posture, balance, gait patterns, joint pain, and environmental barriers to activity.   Consider referral to rehabilitation therapy, especially when mobility or function is impaired due to osteoarthritis and obesity.   Consider referral to weight-loss program that has  published evidence of safety and efficacy if on-site intensive intervention is unavailable or patient preference.   Prepare patient for use of pharmacologic therapy as an adjunct to lifestyle changes based on body mass index, patient agreement and presence of risk factors or comorbidities.   Evaluate efficacy of pharmacologic therapy (weight loss) and tolerance to medication periodically.   Engage in shared decision-making regarding referral to bariatric surgeon for consultation and evaluation when weight-loss goal has not been accomplished by behavioral therapy with or without pharmacologic therapy.    Notes:            Task Due Date Last Modified     Promote Lifestyle and Nutrition Changes to Achieve Weight Loss Goal --  5/17/2023 11:28 AM by Meche Kenyon RN     Care Management Activities:      - activity and exercise based on tolerance encouraged      Notes:            Problem Priority Last Modified     Comorbidities (Obesity) --  5/17/2023 11:28 AM by Meche Kenyon RN              Goal Recent Progress Last Modified     Prevent or Manage Comorbidities --  5/17/2023 11:28 AM by Meche Kenyon RN     Evidence-based guidance:   Assess risk for or presence of comorbid condition.   Prepare patient for laboratory tests and diagnostic studies, such as fasting blood glucose, hemoglobin A1C, lipid panel, metabolic panel, polysomnography, joint x-ray, magnetic resonance imaging.   Implement additional goals and interventions based on identified comorbidities or risk factors; management is required regardless of patient's ability to achieve or sustain weight loss.   Identify and support self-management plan for chronic comorbidities; identify barriers to disease management and brainstorm strategies for success.   Support and encourage lifestyle changes and weight loss efforts as fundamental to reducing risk or severity of comorbidities.   Assess for presence of skin breakdown or infection, especially in skin  folds, under breasts and abdominal aprons.   Promote meticulous skin hygiene to prevent skin breakdown, such as gentle cleansing, moisturizing; consider barrier protectant (zinc oxide, petroleum jelly), topical antifungal or drying agent.   Assess sleep quality; prepare patient for polysomnography and ESTEVAN (obstructive sleep apnea) treatment based on presentation and sleep study results.   Monitor for signs of anxiety or depression; provide or refer for mental health services when needed.   Encourage routine dental care to prevent or manage periodontal disease.   Prepare patient for use of pharmacologic therapy as treatment for comorbidity; monitor efficacy and manage side effects.    Notes:            Task Due Date Last Modified     Monitor and Manage Follow-up for Comorbidities --  5/17/2023 11:28 AM by Meche Kenyon RN     Care Management Activities:      - depression screen reviewed      Notes:            Problem Priority Last Modified     Readiness for Weight Management --  5/17/2023 11:28 AM by Meche Kenyon RN              Goal Recent Progress Last Modified     Plan for Weight Management Developed --  5/17/2023 11:28 AM by Meche Kenyon RN     Evidence-based guidance:   Assess patient perspective on healthy weight, weight loss, motivation, and readiness for weight loss; review current dietary intake and exercise levels.   Review without judgment previous weight-loss attempts; acknowledge the challenge patient is facing.   Discuss psychologic factors related to previous attempts to lose weight (both successes and failures); identify setbacks as opportunities for growth.   Acknowledge understanding of weight-related stigma, embarrassment; assess and address effect on self-esteem and quality of life.   Discuss barriers, including negative body image, self-efficacy, embarrassment, physical impairments, pain, lack of motivation, competing demands, financial constraints.   Acknowledge and validate  that changes in lifestyle and diet may influence social interactions including exclusion from groups and contribute to relapse or discontinuing treatment.   Brainstorm ways to minimize isolation.   Provide anticipatory guidance about benefits of weight loss, including improvement in vitality, self-esteem, sexual health, pain, and mobility, even with small amount of weight loss.   Use motivational interviewing techniques to increase confidence and commitment to change.   Assist patient to set mutual, meaningful goals regarding weight, activity, exercise, and healthy lifestyle; identify change strategies that align with level of readiness for change.    Notes:            Task Due Date Last Modified     Facilitate Readiness for Weight Loss --  5/17/2023 11:28 AM by Meche Kenyon, RN     Care Management Activities:      - encouragement and support provided      Notes:                    · Current Specialty Plan of Care Status signed by both patient and provider    Instructions   · Patient was provided an electronic copy of care plan  · CCM services were explained and offered and patient has accepted these services.  · Patient has given their written consent to receive CCM services and understands that this includes the authorization of electronic communication of medical information with the other treating providers.  · Patient understands that they may stop CCM services at any time and these changes will be effective at the end of the calendar month and will effectively revocate the agreement of CCM services.  · Patient understands that only one practitioner can furnish and be paid for CCM services during one calendar month.  Patient also understands that there may be co-payment or deductible fees in association with CCM services.  · Patient will continue with at least monthly follow-up calls with the Ambulatory .    Meche WATSON  Ambulatory Case Management    5/31/2023, 08:39 EDT

## 2023-06-08 RX ORDER — DICLOFENAC SODIUM 75 MG/1
TABLET, DELAYED RELEASE ORAL
Qty: 60 TABLET | Refills: 0 | Status: SHIPPED | OUTPATIENT
Start: 2023-06-08

## 2023-07-24 ENCOUNTER — PATIENT OUTREACH (OUTPATIENT)
Dept: CASE MANAGEMENT | Facility: OTHER | Age: 63
End: 2023-07-24
Payer: MEDICAID

## 2023-07-24 DIAGNOSIS — J41.8 MIXED SIMPLE AND MUCOPURULENT CHRONIC BRONCHITIS: Primary | ICD-10-CM

## 2023-07-24 DIAGNOSIS — Z59.00 HOMELESSNESS: ICD-10-CM

## 2023-07-24 DIAGNOSIS — N18.31 STAGE 3A CHRONIC KIDNEY DISEASE: ICD-10-CM

## 2023-07-24 DIAGNOSIS — G89.4 CHRONIC PAIN SYNDROME: ICD-10-CM

## 2023-07-24 SDOH — ECONOMIC STABILITY - HOUSING INSECURITY: HOMELESSNESS UNSPECIFIED: Z59.00

## 2023-07-24 NOTE — OUTREACH NOTE
AMBULATORY CASE MANAGEMENT NOTE    Name and Relationship of Patient/Support Person: RaghavAnastasiia - Self    CCM Interim Update    Attempted to reach Ms. Avilez for CCM update. Left message for return call.     Patient has had 7 hospitalizations at [facility] since May.         Education Documentation  No documentation found.        Meche WATSON  Ambulatory Case Management    7/24/2023, 11:45 EDT

## 2023-07-28 RX ORDER — PROMETHAZINE HYDROCHLORIDE 25 MG/1
25 TABLET ORAL EVERY 6 HOURS PRN
Qty: 30 TABLET | Refills: 3 | Status: SHIPPED | OUTPATIENT
Start: 2023-07-28

## 2023-07-28 NOTE — TELEPHONE ENCOUNTER
Caller: Anastasiia Avilez    Relationship: Self    Best call back number: [phone]     Requested Prescriptions:   Requested Prescriptions     Pending Prescriptions Disp Refills    promethazine (PHENERGAN) 25 MG tablet 30 tablet 3     Sig: Take 1 tablet by mouth Every 6 (Six) Hours As Needed for Nausea or Vomiting.        Pharmacy where request should be sent: [id number] [address] [facility] [address] [facility] [phone] [facility] [phone]      Last office visit with prescribing clinician: 5/9/2023   Last telemedicine visit with prescribing clinician: Visit date not found   Next office visit with prescribing clinician: Visit date not found     Additional details provided by patient: PATIENT STATES SHE WILL BE OUT OF MEDICATION TODAY 7.28.23.     Evelyn Stanley Rep   07/28/23 11:55 EDT

## 2023-08-16 ENCOUNTER — TELEPHONE (OUTPATIENT)
Dept: CASE MANAGEMENT | Facility: OTHER | Age: 63
End: 2023-08-16
Payer: MEDICAID

## 2023-08-17 ENCOUNTER — PATIENT OUTREACH (OUTPATIENT)
Dept: CASE MANAGEMENT | Facility: OTHER | Age: 63
End: 2023-08-17
Payer: MEDICAID

## 2023-08-17 DIAGNOSIS — N18.31 STAGE 3A CHRONIC KIDNEY DISEASE: ICD-10-CM

## 2023-08-17 DIAGNOSIS — Z59.00 HOMELESSNESS: ICD-10-CM

## 2023-08-17 DIAGNOSIS — J41.8 MIXED SIMPLE AND MUCOPURULENT CHRONIC BRONCHITIS: Primary | ICD-10-CM

## 2023-08-17 SDOH — ECONOMIC STABILITY - HOUSING INSECURITY: HOMELESSNESS UNSPECIFIED: Z59.00

## 2023-08-17 NOTE — OUTREACH NOTE
AMBULATORY CASE MANAGEMENT NOTE    Name and Relationship of Patient/Support Person: Anastasiia Avilez - Self    [facility] Interim Update    Called patient to outreach discussed housing situation. Patient states that they have been approved section 8 housing and that they should be getting their voucher soon. Patient thankful for this. Patient verbalized they are currently living with someone.     Patient states that SOA is better sometimes and worse other times. States that she is having some swelling. Encouraged patient to elevate feet and legs as much as possible. Patient verbalized that she does and that currently she is sitting in a recliner with them elevated. Discussed restricting fluids with patient. She states that she does limit her fluid intake. Patient states she doesn't wear her oxygen all the time that she only uses it when she is SOA.      Patient appreciative of outreach and denies any other needs at this time.        Education Documentation  No documentation found.        Machelle WALLIS  Ambulatory Case Management    8/17/2023, 15:05 EDT

## 2023-08-29 ENCOUNTER — PATIENT OUTREACH (OUTPATIENT)
Dept: CASE MANAGEMENT | Facility: OTHER | Age: 63
End: 2023-08-29
Payer: MEDICAID

## 2023-08-29 DIAGNOSIS — G89.4 CHRONIC PAIN SYNDROME: ICD-10-CM

## 2023-08-29 DIAGNOSIS — N18.31 STAGE 3A CHRONIC KIDNEY DISEASE: ICD-10-CM

## 2023-08-29 DIAGNOSIS — Z59.00 HOMELESSNESS: ICD-10-CM

## 2023-08-29 DIAGNOSIS — J41.8 MIXED SIMPLE AND MUCOPURULENT CHRONIC BRONCHITIS: Primary | ICD-10-CM

## 2023-08-29 SDOH — ECONOMIC STABILITY - HOUSING INSECURITY: HOMELESSNESS UNSPECIFIED: Z59.00

## 2023-08-29 NOTE — OUTREACH NOTE
[facility] End of Month Documentation    This Chronic Medical Management Care Plan for Anastasiia Avilez, 63 y.o. female, has been monitored and managed and a new plan of care implemented for the month of August.  A cumulative time of 30  minutes was spent on this patient record this month, including phone call with patient; chart review.    Regarding the patient's problems: has Shortness of breath; Multilevel degenerative disc disease; Stage 3a chronic kidney disease; Lumbar radiculopathy; Lumbosacral spondylosis without myelopathy; Anxiety and depression; Chronic obstructive pulmonary disease; Chronic pain; Gastroesophageal reflux disease; Hilar lymphadenopathy; Morbid obesity; Narcotic drug use; Obstructive sleep apnea syndrome; Hypothyroidism; Peripheral edema; Pleural effusion; Poor historian; Smoker; Suspected CHF (congestive heart failure); Unspecified cirrhosis of liver; Overactive bladder; Pneumonia due to infectious organism; and Homelessness on their problem list., the following items were addressed: medical records; medications and any changes can be found within the plan section of the note.  A detailed listing of time spent for chronic care management is tracked within each outreach encounter.  Current medications include:  has a current medication list which includes the following prescription(s): albuterol sulfate hfa, diclofenac, fluoxetine, fluticasone, furosemide, gabapentin, bevespi aerosphere, hydrocodone-acetaminophen, hydroxyzine, levothyroxine, metoclopramide, montelukast, oxybutynin, pantoprazole, promethazine, spironolactone, tizanidine, and trazodone. and the patient is reported to be patient is compliant with medication protocol,  Medications are reported to be effective.  Regarding these diagnoses, referrals were made to the following provider(s):  n/a.  All notes on chart for PCP to review.    The patient was monitored remotely for activity level; mood & behavior.    The patient's physical needs  "include:  physical healthcare.     The patient's mental support needs include:  continued support    The patient's cognitive support needs include:  continued support; health care    The patient's psychosocial support needs include:  continued support    The patient's functional needs include: physical healthcare    The patient's environmental needs include:  not applicable, Patient states they are currently living with somenone    Care Plan overall comments:  to assist patient in medical care and stabilize social situation    Refer to previous outreach notes for more information on the areas listed above.    Monthly Billing Diagnoses  (J41.8) Mixed simple and mucopurulent chronic bronchitis    (N18.31) Stage 3a chronic kidney disease    (Z59.00) Homelessness    (G89.4) Chronic pain syndrome    Medications   Medications have been reconciled    Care Plan progress this month:      Recently Modified Care Plans Updates made since 7/29/2023 12:00 AM       COPD (Adult)           Problem Priority Last Modified     Psychological Adjustment to Diagnosis (COPD) --  5/17/2023 11:28 AM by Meche Kenyon RN              Goal Recent Progress Last Modified     Adjustment to Disease Achieved --  5/17/2023 11:28 AM by Meche Kenyon RN     Evidence-based guidance:   Explore the patient and family's understanding of the disease; use open-ended questions to encourage patient and family to share what is important to them.   Assess and provide support around experience of loss and lifestyle adjustments, such as loss of function, roles, social ties, independence and hobbies.   Support adjustment to  ?onew normal\" with focus on maintaining daily life as closely as possible to life before chronic obstructive pulmonary disease (COPD) diagnosis.   Express empathy; listen actively by encouraging the patient and family to express feelings, concerns and fears; ask questions and encourage open communication regarding embarrassing or " disturbing topics.   Assess for factors that may impact coping or adjustment, such as a preexisting mental health condition, prognosis, lack of social support, debilitating disease or financial difficulty that include no or insufficient insurance coverage.   Assess and address fear or anxiety related to dyspnea or perceived stigma of a noninfectious cough.   Prepare for re-entry into work, school and social activities; establish links or collaborate with mental health resources in the community, such as peer support or advocacy groups.   Assess anxiety, feelings of panic when breathing becomes labored, as well as impact on family/caregiver; consider cognitive behavioral therapy, deep breathing, meditation, visualization, photo or light therapy.   Encourage advanced care planning discussion to clarify goals of care; palliative care or hospice may be considered for advanced COPD patients if it aligns with patient's goals of care.   Explore common risk factors for depression, such as personal or family history of depression, substance use and stressors that include missed work, losing ability to do what patient was used to doing, changes in appearance, physical    or sexual abuse.   Destigmatize depression by presenting it as a problem requiring treatment, rather than a personal weakness.   Use a validated screening tool to identify level of suicide risk. If at risk, develop safety plan and implement additional safety measures based on level of risk, such as behavioral health referral or immediate assessment.    Notes:            Task Due Date Last Modified     Support Psychosocial Response to Chronic Obstructive Pulmonary Disease --  8/17/2023  2:59 PM by Machelle Justice, RN     Care Management Activities:      - not discussed during this outreach      Notes:                Problem Priority Last Modified     Disease Progression (COPD) --  5/17/2023 11:28 AM by Meche Kenyon, JAIRO              Goal Recent Progress  Last Modified     Disease Progression Minimized or Managed --  5/17/2023 11:28 AM by Meche Kenyon RN     Evidence-based guidance:   Identify current smoking/tobacco use; provide smoking cessation intervention.   Assess symptom control by the frequency and type of symptoms, reliever use and activity limitation at every encounter.   Assess risk for exacerbation (flare up) by evaluating spirometry, pulse oximetry, reliever use, presentation of symptoms and activity limitation; anticipate treatment adjustment based on risks and resources.   Develop and/or review and reinforce use of COPD rescue (action) plan even when symptoms are controlled or infrequent.   Ask patient to bring inhaler to all visits; assess and reinforce correct technique; address barriers to proper inhaler use, such as older age, use of multiple devices and lack of understanding.    Identify symptom triggers, such as smoking, virus, weather change, emotional upset, exercise, obesity and environmental allergen; consider reduction of work-exposure versus elimination to avoid compromising employment.   Correlate presentation to comorbidity, such as diabetes, heart failure, obstructive sleep apnea, depression and anxiety, which may worsen symptoms.   Prepare for individualized pharmacologic therapy that may include LABA (long-acting beta-2 agonist), LAMA (long-acting muscarinic antagonist), [place] (short-acting beta-2 agonist) oral or inhaled corticosteroid.   Promote participation in pulmonary rehabilitation for breathing exercises, skills training, improved exercise capacity, mood and quality of life; address barriers to participation.   Promote physical activity or exercise to improve or maintain exercise capacity, based on tolerance that may include walking, water exercise, cycling or limb muscle strength training.   Promote use of energy conservation and activity pacing techniques.   Promote use of breathing and coughing techniques, such as  inspiratory muscle training, pursed-lip breathing, diaphragmatic breathing, pranayama yoga breathing or vee cough.   Screen for malnutrition risk factors, such as unintentional weight loss and poor oral intake; refer to dietitian if identified.   Consider recommendation for oral drink supplement or multivitamin and mineral supplements if suspect inadequate oral intake or micronutrient deficiencies.    Screen for obstructive sleep apnea; prepare patient for polysomnography based on risk and presentation.   Prepare patient for use of long-term oxygen and noninvasive ventilation to relieve hypercapnia, hypoxemia, obstructive sleep apnea and reduce work of breathing.   Prepare patient with worsening disease for surgical interventions that may include bronchoscopy, lung volume reduction surgery, bullectomy or lung transplantation.    Notes:            Task Due Date Last Modified     Alleviate Barriers to COPD Management --  8/17/2023  2:59 PM by Machelle Justice RN     Care Management Activities:      - activity or exercise based on tolerance encouraged      Notes:                Problem Priority Last Modified     Symptom Exacerbation (COPD) --  5/17/2023 11:28 AM by Meche Kenyon RN              Goal Recent Progress Last Modified     Symptom Exacerbation Prevented or Minimized --  5/17/2023 11:28 AM by Meche Kenyon RN     Evidence-based guidance:   Monitor for signs of respiratory infection, including changes in sputum color, volume and thickness, as well as fever.   Encourage infection prevention strategies that may include prophylactic antibiotic therapy for patients with history of frequent exacerbations or antibiotic administration during exacerbation based on presentation, risk and benefit.   Encourage receipt of influenza and pneumococcal vaccine.   Prepare patient for use of home long-term oxygen therapy in presence of sever resting hypoxemia.   Prepare patients for laboratory studies or diagnostic  exams, such as spirometry, pulse oximetry and arterial blood gas based on current symptoms, risk factors and presentation.   Assess barriers and manage adherence, including inhaler technique and persistent trigger exposure; encourage adherence, even when symptoms are controlled or infrequent.   Assess and monitor for signs/symptoms of psychosocial concerns, such as shortness of breath-anxiety cycle or depression that may impact stability of symptoms.   Identify economic resources, sociocultural beliefs, social factors and health literacy that may interfere with adherence.   Promote lifestyle changes when needed, including regular physical activity based on tolerance, weight loss, healthy eating and stress management.   Consider referral to nurse or community health worker or home-visiting program for intensive support and education (disease-management program).   Increase frequency of follow-up following exacerbation or hospitalization; consider transition of care interventions, such as hospital visit, home visit, telephone follow-up, review of discharge summary and resource referrals.    Notes:            Task Due Date Last Modified     Identify and Minimize Risk of COPD Exacerbation --  8/17/2023  3:00 PM by Machelle Justice RN     Care Management Activities:      - not discussed during this outreach      Notes:                     Obesity (Adult)           Problem Priority Last Modified     Weight Management (Obesity) --  5/17/2023 11:28 AM by Meche Kenyon RN              Goal Recent Progress Last Modified     Weight Loss Achieved --  5/17/2023 11:28 AM by Meche Kenyon RN     Evidence-based guidance:   Review medication that may contribute to weight gain, such as corticosteroid, beta-blocker, tricyclic antidepressant, oral antihyperglycemic; advocate for changes when appropriate.   Perform or refer to registered dietitian to perform comprehensive nutrition assessment that includes disordered-eating  behaviors, such as binge-eating, emotional or compulsive eating, grazing.    patient regarding health risks of obesity and that weight loss goal of 5 to 10 percent of initial weight will improve risk.   Recommend initial weight loss goal of 3 to 5 percent of bodyweight; increase weight-loss goals based on patient success as achieving greater weight loss continues to reduce risk.   Propose a calorie-reduced diet based on the patient's preferences and health status.   Provide ongoing emotional support or cognitive behavioral therapy and dietitian services (individual, group, virtual) over at least 6 months with a minimum of 14 encounters to best facilitate weight loss.   Provide monthly follow-up for 12 months when weight loss goal is met to assist with maintenance of weight loss.   Encourage increased physical activity or exercise based on individual age, risk, and ability up to 200 to 300 minutes per week that includes aerobic and resistance training.   Encourage reduction in sedentary behaviors by replacing them with nonexercise yet active leisure pursuits.   Identify physical barriers, such as change in posture, balance, gait patterns, joint pain, and environmental barriers to activity.   Consider referral to rehabilitation therapy, especially when mobility or function is impaired due to osteoarthritis and obesity.   Consider referral to weight-loss program that has published evidence of safety and efficacy if on-site intensive intervention is unavailable or patient preference.   Prepare patient for use of pharmacologic therapy as an adjunct to lifestyle changes based on body mass index, patient agreement and presence of risk factors or comorbidities.   Evaluate efficacy of pharmacologic therapy (weight loss) and tolerance to medication periodically.   Engage in shared decision-making regarding referral to bariatric surgeon for consultation and evaluation when weight-loss goal has not been accomplished by  behavioral therapy with or without pharmacologic therapy.    Notes:            Task Due Date Last Modified     Promote Lifestyle and Nutrition Changes to Achieve Weight Loss Goal --  8/17/2023  3:00 PM by Machelle Justice RN     Care Management Activities:      - activity and exercise based on tolerance encouraged      Notes:                Problem Priority Last Modified     Comorbidities (Obesity) --  5/17/2023 11:28 AM by Meche Kenyon RN              Goal Recent Progress Last Modified     Prevent or Manage Comorbidities --  5/17/2023 11:28 AM by Meche Kenyon, RN     Evidence-based guidance:   Assess risk for or presence of comorbid condition.   Prepare patient for laboratory tests and diagnostic studies, such as fasting blood glucose, hemoglobin A1C, lipid panel, metabolic panel, polysomnography, joint x-ray, magnetic resonance imaging.   Implement additional goals and interventions based on identified comorbidities or risk factors; management is required regardless of patient's ability to achieve or sustain weight loss.   Identify and support self-management plan for chronic comorbidities; identify barriers to disease management and brainstorm strategies for success.   Support and encourage lifestyle changes and weight loss efforts as fundamental to reducing risk or severity of comorbidities.   Assess for presence of skin breakdown or infection, especially in skin folds, under breasts and abdominal aprons.   Promote meticulous skin hygiene to prevent skin breakdown, such as gentle cleansing, moisturizing; consider barrier protectant (zinc oxide, petroleum jelly), topical antifungal or drying agent.   Assess sleep quality; prepare patient for polysomnography and ESTEVAN (obstructive sleep apnea) treatment based on presentation and sleep study results.   Monitor for signs of anxiety or depression; provide or refer for mental health services when needed.   Encourage routine dental care to prevent or manage  periodontal disease.   Prepare patient for use of pharmacologic therapy as treatment for comorbidity; monitor efficacy and manage side effects.    Notes:            Task Due Date Last Modified     Monitor and Manage Follow-up for Comorbidities --  8/17/2023  3:00 PM by Machelle Justice RN     Care Management Activities:      - not discussed during this outreach      Notes:                Problem Priority Last Modified     Readiness for Weight Management --  5/17/2023 11:28 AM by Meche Kenyon RN              Goal Recent Progress Last Modified     Plan for Weight Management Developed --  5/17/2023 11:28 AM by Meche Kenyon RN     Evidence-based guidance:   Assess patient perspective on healthy weight, weight loss, motivation, and readiness for weight loss; review current dietary intake and exercise levels.   Review without judgment previous weight-loss attempts; acknowledge the challenge patient is facing.   Discuss psychologic factors related to previous attempts to lose weight (both successes and failures); identify setbacks as opportunities for growth.   Acknowledge understanding of weight-related stigma, embarrassment; assess and address effect on self-esteem and quality of life.   Discuss barriers, including negative body image, self-efficacy, embarrassment, physical impairments, pain, lack of motivation, competing demands, financial constraints.   Acknowledge and validate that changes in lifestyle and diet may influence social interactions including exclusion from groups and contribute to relapse or discontinuing treatment.   Brainstorm ways to minimize isolation.   Provide anticipatory guidance about benefits of weight loss, including improvement in vitality, self-esteem, sexual health, pain, and mobility, even with small amount of weight loss.   Use motivational interviewing techniques to increase confidence and commitment to change.   Assist patient to set mutual, meaningful goals regarding  weight, activity, exercise, and healthy lifestyle; identify change strategies that align with level of readiness for change.    Notes:            Task Due Date Last Modified     Facilitate Readiness for Weight Loss --  8/17/2023  3:00 PM by Machelle Justice RN     Care Management Activities:      - not discussed during this outreach      Notes:                        Instructions   Patient was provided an electronic copy of care plan  CCM services were explained and offered and patient has accepted these services.  Patient has given their written consent to receive CCM services and understands that this includes the authorization of electronic communication of medical information with the other treating providers.  Patient understands that they may stop CCM services at any time and these changes will be effective at the end of the calendar month and will effectively revocate the agreement of CCM services.  Patient understands that only one practitioner can furnish and be paid for CCM services during one calendar month.  Patient also understands that there may be co-payment or deductible fees in association with CCM services.  Patient will continue with at least monthly follow-up calls with the Ambulatory .    Meche WATSON  Ambulatory Case Management    8/29/2023, 10:25 EDT

## 2023-09-25 ENCOUNTER — TELEPHONE (OUTPATIENT)
Dept: FAMILY MEDICINE CLINIC | Facility: CLINIC | Age: 63
End: 2023-09-25

## 2023-09-25 NOTE — TELEPHONE ENCOUNTER
Caller: Anastasiia Avilez    Relationship: Self    Best call back number: [phone]    What is the medical concern/diagnosis: NEEDS TRANSPORTATION TO APPOINTMENT    What specialty or service is being requested: TRANSPORTATION    What is the provider, practice or medical service name: [facility]    What is the office location:     What is the office phone number: [phone]    Any additional details:PATIENT STATES THAT SHE NEEDS REFERRAL TODAY AND PLEASE GIVE HER A CALL

## 2023-09-26 NOTE — TELEPHONE ENCOUNTER
Pt Contacted; Pt has to get us the PW for the transportation then we can sign it. Pt verbalized understanding

## 2023-09-28 RX ORDER — DICLOFENAC SODIUM 75 MG/1
TABLET, DELAYED RELEASE ORAL
Qty: 60 TABLET | Refills: 0 | Status: SHIPPED | OUTPATIENT
Start: 2023-09-28

## 2023-10-05 RX ORDER — FLUTICASONE PROPIONATE 50 MCG
SPRAY, SUSPENSION (ML) NASAL
Qty: 48 G | Refills: 5 | Status: SHIPPED | OUTPATIENT
Start: 2023-10-05

## 2023-10-24 ENCOUNTER — PATIENT OUTREACH (OUTPATIENT)
Dept: CASE MANAGEMENT | Facility: OTHER | Age: 63
End: 2023-10-24
Payer: MEDICAID

## 2023-10-24 DIAGNOSIS — J41.8 MIXED SIMPLE AND MUCOPURULENT CHRONIC BRONCHITIS: Primary | ICD-10-CM

## 2023-10-24 DIAGNOSIS — N18.31 STAGE 3A CHRONIC KIDNEY DISEASE: ICD-10-CM

## 2023-10-24 DIAGNOSIS — G89.4 CHRONIC PAIN SYNDROME: ICD-10-CM

## 2023-10-24 NOTE — OUTREACH NOTE
AMBULATORY CASE MANAGEMENT NOTE    Name and Relationship of Patient/Support Person: Anastasiia Avilez - Self    CCM Interim Update    Spoke with patient for CCM update. She states that her breathing has been so so. When asked if it was better or worse she states it's the same. Very difficult to get information from the patient. She states that she is using her o2@2l but then states that she only uses it when she needs it. Asked patient how she knows when she needs it and she states when her chest feels tight. She also continues to smoke. She states that she has cut back significantly to 1/2 ppd. Encouragement given for cessation. Patient instructed to not smoke near o2. She does have edema but does not weigh herself. She states that she has difficulty putting on shoes and socks.     Her last visit to hospital was 9/1/23. She went for headache and vomiting. She was treated and released. She denies any recent falls. She states that they are living in their car again. They are in the process of completing housing application.     She was due for follow up with pcp in June. Asked patient if she is ready to schedule follow up appointment. Prior to saying yes she asked her boyfriend if he wants her to schedule. He said yes and appointment scheduled for patient for 11/6/23 with [facility].         Education Documentation  Nonadherence to Treatment Plan, taught by Meche Kenyon RN at 10/24/2023  3:03 PM.  Learner: Patient  Readiness: Nonacceptance  Method: Explanation  Response: Needs Reinforcement  Comment: Patient states that she only uses her oxygen when her chest feels tight. Education given that this is not a good indicator for when she needs o2.          Meche WATSON  Ambulatory Case Management    10/24/2023, 15:04 EDT

## 2023-10-24 NOTE — TELEPHONE ENCOUNTER
Caller: Anastasiia Avilez    Relationship: Self    Best call back number: [phone]     Requested Prescriptions:   Requested Prescriptions     Pending Prescriptions Disp Refills    promethazine (PHENERGAN) 25 MG tablet 30 tablet 3     Sig: Take 1 tablet by mouth Every 6 (Six) Hours As Needed for Nausea or Vomiting.        Pharmacy where request should be sent: [id number] [address] [facility] [address] [facility] [phone] [facility] [phone]      Last office visit with prescribing clinician: 5/9/2023   Last telemedicine visit with prescribing clinician: Visit date not found   Next office visit with prescribing clinician: 11/6/2023     Additional details provided by patient: 2-3 DAYS REMAINING.     Does the patient have less than a 3 day supply:  [x] Yes  [] No    Would you like a call back once the refill request has been completed: [x] Yes [] No    If the office needs to give you a call back, can they leave a voicemail: [x] Yes [] No    Evelyn Christianson Rep   10/24/23 15:37 EDT

## 2023-10-25 RX ORDER — PROMETHAZINE HYDROCHLORIDE 25 MG/1
25 TABLET ORAL EVERY 6 HOURS PRN
Qty: 30 TABLET | Refills: 3 | Status: SHIPPED | OUTPATIENT
Start: 2023-10-25

## 2023-10-30 ENCOUNTER — PATIENT OUTREACH (OUTPATIENT)
Dept: CASE MANAGEMENT | Facility: OTHER | Age: 63
End: 2023-10-30
Payer: MEDICAID

## 2023-10-30 DIAGNOSIS — G89.4 CHRONIC PAIN SYNDROME: ICD-10-CM

## 2023-10-30 DIAGNOSIS — N18.31 STAGE 3A CHRONIC KIDNEY DISEASE: ICD-10-CM

## 2023-10-30 DIAGNOSIS — J41.8 MIXED SIMPLE AND MUCOPURULENT CHRONIC BRONCHITIS: Primary | ICD-10-CM

## 2023-10-30 RX ORDER — DICLOFENAC SODIUM 75 MG/1
TABLET, DELAYED RELEASE ORAL
Qty: 60 TABLET | Refills: 0 | Status: SHIPPED | OUTPATIENT
Start: 2023-10-30

## 2023-10-30 NOTE — OUTREACH NOTE
[facility] End of Month Documentation    This Chronic Medical Management Care Plan for Anastasiia Avilez, 63 y.o. female, has been monitored and managed; reviewed and a new plan of care implemented for the month of October.  A cumulative time of 21  minutes was spent on this patient record this month, including phone call with patient; chart review.    Regarding the patient's problems: has Shortness of breath; Multilevel degenerative disc disease; Stage 3a chronic kidney disease; Lumbar radiculopathy; Lumbosacral spondylosis without myelopathy; Anxiety and depression; Chronic obstructive pulmonary disease; Chronic pain; Gastroesophageal reflux disease; Hilar lymphadenopathy; Morbid obesity; Narcotic drug use; Obstructive sleep apnea syndrome; Hypothyroidism; Peripheral edema; Pleural effusion; Poor historian; Smoker; Suspected CHF (congestive heart failure); Unspecified cirrhosis of liver; Overactive bladder; Pneumonia due to infectious organism; and Homelessness on their problem list., the following items were addressed: medical records; medications and any changes can be found within the plan section of the note.  A detailed listing of time spent for chronic care management is tracked within each outreach encounter.  Current medications include:  has a current medication list which includes the following prescription(s): albuterol sulfate hfa, diclofenac, fluoxetine, fluticasone, furosemide, gabapentin, bevespi aerosphere, hydrocodone-acetaminophen, hydroxyzine, levothyroxine, metoclopramide, montelukast, oxybutynin, pantoprazole, promethazine, spironolactone, tizanidine, and trazodone. and the patient is reported to be patient is compliant with medication protocol,  Medications are reported to be effective.  Regarding these diagnoses, referrals were made to the following provider(s):  n/a.  All notes on chart for PCP to review.    The patient was monitored remotely for activity level; mood & behavior.    The patient's  physical needs include:  physical healthcare.     The patient's mental support needs include:  continued support    The patient's cognitive support needs include:  continued support; health care    The patient's psychosocial support needs include:  continued support    The patient's functional needs include: physical healthcare    The patient's environmental needs include:  inadequate living structure    Care Plan overall comments:  to assist patient in medical care and stabilize social situation    Refer to previous outreach notes for more information on the areas listed above.    Monthly Billing Diagnoses  (J41.8) Mixed simple and mucopurulent chronic bronchitis    (N18.31) Stage 3a chronic kidney disease    (G89.4) Chronic pain syndrome    Medications   Medications have been reconciled    Care Plan progress this month:      Recently Modified Care Plans Updates made since 9/29/2023 12:00 AM      No recently modified care plans.            Instructions   Patient was provided an electronic copy of care plan  CCM services were explained and offered and patient has accepted these services.  Patient has given their written consent to receive CCM services and understands that this includes the authorization of electronic communication of medical information with the other treating providers.  Patient understands that they may stop CCM services at any time and these changes will be effective at the end of the calendar month and will effectively revocate the agreement of CCM services.  Patient understands that only one practitioner can furnish and be paid for CCM services during one calendar month.  Patient also understands that there may be co-payment or deductible fees in association with CCM services.  Patient will continue with at least monthly follow-up calls with the Ambulatory .    Meche WATSON  Ambulatory Case Management    10/30/2023, 14:20 EDT

## 2023-11-06 ENCOUNTER — OFFICE VISIT (OUTPATIENT)
Dept: FAMILY MEDICINE CLINIC | Facility: CLINIC | Age: 63
End: 2023-11-06
Payer: MEDICAID

## 2023-11-06 VITALS
TEMPERATURE: 98.5 F | SYSTOLIC BLOOD PRESSURE: 124 MMHG | HEIGHT: 69 IN | OXYGEN SATURATION: 95 % | WEIGHT: 293 LBS | DIASTOLIC BLOOD PRESSURE: 82 MMHG | HEART RATE: 67 BPM | BODY MASS INDEX: 43.4 KG/M2

## 2023-11-06 DIAGNOSIS — Z59.00 HOMELESSNESS: ICD-10-CM

## 2023-11-06 DIAGNOSIS — E87.6 HYPOKALEMIA: ICD-10-CM

## 2023-11-06 DIAGNOSIS — J41.8 MIXED SIMPLE AND MUCOPURULENT CHRONIC BRONCHITIS: Primary | ICD-10-CM

## 2023-11-06 PROCEDURE — 99213 OFFICE O/P EST LOW 20 MIN: CPT | Performed by: PHYSICIAN ASSISTANT

## 2023-11-06 PROCEDURE — 1160F RVW MEDS BY RX/DR IN RCRD: CPT | Performed by: PHYSICIAN ASSISTANT

## 2023-11-06 PROCEDURE — 1159F MED LIST DOCD IN RCRD: CPT | Performed by: PHYSICIAN ASSISTANT

## 2023-11-06 RX ORDER — FLUTICASONE FUROATE, UMECLIDINIUM BROMIDE AND VILANTEROL TRIFENATATE 100; 62.5; 25 UG/1; UG/1; UG/1
1 POWDER RESPIRATORY (INHALATION) DAILY
COMMUNITY
Start: 2023-07-10

## 2023-11-06 RX ORDER — AZITHROMYCIN 250 MG/1
TABLET, FILM COATED ORAL
COMMUNITY
Start: 2023-11-04

## 2023-11-06 RX ORDER — PROMETHAZINE HYDROCHLORIDE 25 MG/1
25 TABLET ORAL EVERY 6 HOURS PRN
Qty: 30 TABLET | Refills: 3 | Status: SHIPPED | OUTPATIENT
Start: 2023-11-06

## 2023-11-06 SDOH — ECONOMIC STABILITY - HOUSING INSECURITY: HOMELESSNESS UNSPECIFIED: Z59.00

## 2023-11-06 NOTE — ASSESSMENT & PLAN NOTE
"COPD is improving with treatment.  Discussed monitoring symptoms and use of quick-relief medications and contacting us early in the course of exacerbations.  Warning signs of respiratory distress were reviewed with the patient.   Continue current medications.   We discussed the importance of stopping smoking and she stated \"they are cutting back.\"         "

## 2023-11-06 NOTE — PROGRESS NOTES
"Chief Complaint  Er Follow Up    Subjective          Anastasiia Avilez presents to [facility]  History of Present Illness  patient is kayla seen today for follow up of COPD exacerbation.  She was in the ER on 11/4/23 and evaluated for SOA and Chest discomfort. Cardiac source ruled out, pneumonia and PE ruled out, determined to be related to COPD exacerbation and the patient not using her home oxygen as prescribed.  She reports today that she is feeling much better, the steroids and antiboitics she was given are helping, she continues to be homeless and living in her car and therefore she does not use her oxygen like she should because of this.  They are working with someone right now and possibly will have a home to move in to soon.       Objective   Vital Signs:   /82 (BP Location: Left arm)   Pulse 67   Temp 98.5 °F (36.9 °C)   Ht 175.3 cm (69\")   Wt (!) 151 kg (333 lb)   SpO2 95%   BMI 49.18 kg/m²     Body mass index is 49.18 kg/m².    Review of Systems   Constitutional:  Negative for chills, fatigue and fever.   Respiratory:  Negative for cough, chest tightness, shortness of breath and wheezing.    Cardiovascular:  Negative for chest pain and palpitations.   Musculoskeletal:  Negative for back pain and myalgias.   Neurological:  Negative for dizziness and headache.   Psychiatric/Behavioral:  Negative for depressed mood. The patient is not nervous/anxious.        Past History:  Medical History: has a past medical history of Multilevel degenerative disc disease.   Surgical History: has no past surgical history on file.   Family History: family history is not on file.   Social History: reports that she has been smoking cigarettes. She has a 40.00 pack-year smoking history. She has never used smokeless tobacco. She reports that she does not currently use alcohol. She reports that she does not use drugs.      Current Outpatient Medications:     albuterol sulfate  (90 Base) " MCG/ACT inhaler, Inhale 2 puffs Every 4 (Four) Hours As Needed for Wheezing., Disp: 18 g, Rfl: 12    azithromycin (ZITHROMAX) 250 MG tablet, , Disp: , Rfl:     diclofenac (VOLTAREN) 75 MG EC tablet, TAKE 1 TABLET BY MOUTH TWICE DAILY, Disp: 60 tablet, Rfl: 0    FLUoxetine (PROzac) 40 MG capsule, Take 1 capsule by mouth Daily., Disp: 90 capsule, Rfl: 1    fluticasone (FLONASE) 50 MCG/ACT nasal spray, USE 2 SPRAYS IN EACH NOSTRIL DAILY AS DIRECTED, Disp: 48 g, Rfl: 5    furosemide (LASIX) 40 MG tablet, Take 1 tablet by mouth Daily., Disp: 90 tablet, Rfl: 1    Glycopyrrolate-Formoterol (Bevespi Aerosphere) 9-4.8 MCG/ACT aerosol, Inhale 1 spray Daily., Disp: 10.7 g, Rfl: 5    hydrOXYzine (ATARAX) 25 MG tablet, Take 1 tablet by mouth 3 (Three) Times a Day., Disp: 90 tablet, Rfl: 3    levothyroxine (SYNTHROID, LEVOTHROID) 75 MCG tablet, Take 1 tablet by mouth Daily., Disp: 90 tablet, Rfl: 1    montelukast (SINGULAIR) 10 MG tablet, Take 1 tablet by mouth Every Night., Disp: 90 tablet, Rfl: 3    oxybutynin (DITROPAN) 5 MG tablet, Take 1 tablet by mouth 2 (Two) Times a Day., Disp: 60 tablet, Rfl: 5    pantoprazole (PROTONIX) 40 MG EC tablet, Take 1 tablet by mouth Daily., Disp: 90 tablet, Rfl: 1    promethazine (PHENERGAN) 25 MG tablet, Take 1 tablet by mouth Every 6 (Six) Hours As Needed for Nausea or Vomiting., Disp: 30 tablet, Rfl: 3    spironolactone (ALDACTONE) 25 MG tablet, Take 1 tablet by mouth Daily., Disp: 90 tablet, Rfl: 3    tiZANidine (ZANAFLEX) 4 MG tablet, TAKE ONE TABLET BY MOUTH 3 TIMES A DAY, Disp: 30 tablet, Rfl: 0    traZODone (DESYREL) 150 MG tablet, Take 1 or 2 every bedtime for sleep, Disp: 60 tablet, Rfl: 5    Trelegy Ellipta 100-62.5-25 MCG/ACT inhaler, Inhale 1 puff Daily., Disp: , Rfl:     Allergies: Sulfa antibiotics and Adhesive tape    Physical Exam  Constitutional:       Appearance: She is obese.   Cardiovascular:      Rate and Rhythm: Normal rate and regular rhythm.   Pulmonary:      Effort:  "Pulmonary effort is normal. No respiratory distress.      Breath sounds: Normal breath sounds. No wheezing, rhonchi or rales.   Neurological:      Mental Status: She is alert and oriented to person, place, and time.   Psychiatric:         Behavior: Behavior normal.          Result Review :                   Assessment and Plan    Diagnoses and all orders for this visit:    1. Mixed simple and mucopurulent chronic bronchitis (Primary)  Assessment & Plan:  COPD is improving with treatment.  Discussed monitoring symptoms and use of quick-relief medications and contacting us early in the course of exacerbations.  Warning signs of respiratory distress were reviewed with the patient.   Continue current medications.   We discussed the importance of stopping smoking and she stated \"they are cutting back.\"             2. Homelessness  Assessment & Plan:  They are working on getting this issue resolved with permanent residence.        3. Hypokalemia  Assessment & Plan:  Her labs did reveal a mild hypokalemia and it was replaced in the ER, I will get labs today to make sure this remains stable.     Orders:  -     Basic Metabolic Panel; Future    Other orders  -     promethazine (PHENERGAN) 25 MG tablet; Take 1 tablet by mouth Every 6 (Six) Hours As Needed for Nausea or Vomiting.  Dispense: 30 tablet; Refill: 3        Follow Up   Return in about 4 weeks (around 12/4/2023) for Recheck.  Patient was given instructions and counseling regarding her condition or for health maintenance advice. Please see specific information pulled into the AVS if appropriate.     Radha Valladares PA-C  "

## 2023-11-06 NOTE — ASSESSMENT & PLAN NOTE
Her labs did reveal a mild hypokalemia and it was replaced in the ER, I will get labs today to make sure this remains stable.

## 2023-11-13 ENCOUNTER — TELEPHONE (OUTPATIENT)
Dept: FAMILY MEDICINE CLINIC | Facility: CLINIC | Age: 63
End: 2023-11-13

## 2023-11-13 RX ORDER — HYDROCODONE BITARTRATE AND ACETAMINOPHEN 7.5; 325 MG/1; MG/1
1 TABLET ORAL 3 TIMES DAILY
COMMUNITY
Start: 2023-11-06

## 2023-11-13 RX ORDER — ALBUTEROL SULFATE 2.5 MG/3ML
2.5 SOLUTION RESPIRATORY (INHALATION) EVERY 4 HOURS PRN
Qty: 60 EACH | Refills: 12 | Status: SHIPPED | OUTPATIENT
Start: 2023-11-13

## 2023-11-13 RX ORDER — GABAPENTIN 800 MG/1
1 TABLET ORAL 3 TIMES DAILY
COMMUNITY
Start: 2023-11-08

## 2023-11-13 NOTE — TELEPHONE ENCOUNTER
Patient called stating she missed a call from the office, I was unaware who called and told her someone would give her a call back

## 2023-11-13 NOTE — TELEPHONE ENCOUNTER
Caller: Anastasiia Avilez    Relationship: Self    Best call back number: [phone]     Requested Prescriptions:   Requested Prescriptions      No prescriptions requested or ordered in this encounter    NEBULIZER SOLUTION     Pharmacy where request should be sent: [id number] - [place], [place] - [address]  [address] - [phone]  - [phone] FX     Last office visit with prescribing clinician: 11/6/2023   Last telemedicine visit with prescribing clinician: Visit date not found   Next office visit with prescribing clinician: 12/6/2023     Does the patient have less than a 3 day supply:  [x] Yes  [] No    Evelyn Stanley Rep   11/13/23 10:31 EST

## 2023-11-30 RX ORDER — TRAZODONE HYDROCHLORIDE 150 MG/1
TABLET ORAL
Qty: 60 TABLET | Refills: 5 | Status: SHIPPED | OUTPATIENT
Start: 2023-11-30

## 2023-11-30 RX ORDER — DICLOFENAC SODIUM 75 MG/1
TABLET, DELAYED RELEASE ORAL
Qty: 60 TABLET | Refills: 0 | Status: SHIPPED | OUTPATIENT
Start: 2023-11-30

## 2023-12-14 ENCOUNTER — PATIENT OUTREACH (OUTPATIENT)
Dept: CASE MANAGEMENT | Facility: OTHER | Age: 63
End: 2023-12-14

## 2023-12-14 DIAGNOSIS — N18.31 STAGE 3A CHRONIC KIDNEY DISEASE: ICD-10-CM

## 2023-12-14 DIAGNOSIS — J90 PLEURAL EFFUSION: ICD-10-CM

## 2023-12-14 DIAGNOSIS — E66.01 MORBID OBESITY: ICD-10-CM

## 2023-12-14 DIAGNOSIS — G89.4 CHRONIC PAIN SYNDROME: ICD-10-CM

## 2023-12-14 DIAGNOSIS — R06.02 SHORTNESS OF BREATH: ICD-10-CM

## 2023-12-14 DIAGNOSIS — R09.89 SUSPECTED CHF (CONGESTIVE HEART FAILURE): ICD-10-CM

## 2023-12-14 DIAGNOSIS — J41.8 MIXED SIMPLE AND MUCOPURULENT CHRONIC BRONCHITIS: Primary | ICD-10-CM

## 2023-12-14 DIAGNOSIS — R60.9 PERIPHERAL EDEMA: Primary | ICD-10-CM

## 2023-12-14 RX ORDER — FUROSEMIDE 40 MG/1
40 TABLET ORAL DAILY
Qty: 90 TABLET | Refills: 1 | Status: SHIPPED | OUTPATIENT
Start: 2023-12-14

## 2023-12-14 NOTE — OUTREACH NOTE
AMBULATORY CASE MANAGEMENT NOTE    Name and Relationship of Patient/Support Person: Anastasiia Avilez - Self    CCM Interim Update    Spoke with Anastasiia for CCM update. They have been approved for an [facility] and will be moving in 1/1/24. She is very happy about that. Their rent will be $350. They have rented a hotel room for 1 week with the help from a friend. This has helped her to be able to elevate her feet. Otherwise they are still living in their truck. They have a friend who has allowed them to park beside his garage and they have an electric cord.     Patients breathing does not sound as good today. Patient states she does not feel good today. She states she is using her nebulizer q4h but doesn't if she feels like she doesn't need it. She states that she only uses her o2 when she needs it but does not have pulse ox to monitor her sats.     She states that she has increased edema in her legs up to her knees. She states that she is taking her lasix but has not filled recently. Patient then states she needs refill. Refill sent to [facility]. Patient states that she may go to ER Monday if edema not better. Now that she is currently in hotel she is able to elevate her feet. Encouraged her to be seen sooner and reminded also of Saturday clinic.     She has not gotten a flu shot yet. Encouraged patient to get these vaccines.     Patient states that she has insurance through [employer] and that it is still current.       Education Documentation  No documentation found.        Meche WATSON  Ambulatory Case Management    12/14/2023, 09:54 EST

## 2023-12-28 ENCOUNTER — PATIENT OUTREACH (OUTPATIENT)
Dept: CASE MANAGEMENT | Facility: OTHER | Age: 63
End: 2023-12-28

## 2023-12-28 DIAGNOSIS — G89.4 CHRONIC PAIN SYNDROME: ICD-10-CM

## 2023-12-28 DIAGNOSIS — J41.8 MIXED SIMPLE AND MUCOPURULENT CHRONIC BRONCHITIS: Primary | ICD-10-CM

## 2023-12-28 DIAGNOSIS — N18.31 STAGE 3A CHRONIC KIDNEY DISEASE: ICD-10-CM

## 2023-12-28 RX ORDER — DICLOFENAC SODIUM 75 MG/1
TABLET, DELAYED RELEASE ORAL
Qty: 60 TABLET | Refills: 0 | Status: SHIPPED | OUTPATIENT
Start: 2023-12-28

## 2023-12-28 NOTE — OUTREACH NOTE
[facility] End of Month Documentation    This Chronic Medical Management Care Plan for Anastasiia Avilez, 63 y.o. female, has been monitored and managed; reviewed and a new plan of care implemented for the month of December.  A cumulative time of 26  minutes was spent on this patient record this month, including phone call with patient; chart review; electronic communication with pharmacist.    Regarding the patient's problems: has Shortness of breath; Multilevel degenerative disc disease; Stage 3a chronic kidney disease; Lumbar radiculopathy; Lumbosacral spondylosis without myelopathy; Anxiety and depression; Chronic obstructive pulmonary disease; Chronic pain; Gastroesophageal reflux disease; Hilar lymphadenopathy; Morbid obesity; Narcotic drug use; Obstructive sleep apnea syndrome; Hypothyroidism; Peripheral edema; Pleural effusion; Poor historian; Smoker; Suspected CHF (congestive heart failure); Unspecified cirrhosis of liver; Overactive bladder; Pneumonia due to infectious organism; Homelessness; and Hypokalemia on their problem list., the following items were addressed: medical records; medications and any changes can be found within the plan section of the note.  A detailed listing of time spent for chronic care management is tracked within each outreach encounter.  Current medications include:  has a current medication list which includes the following prescription(s): albuterol, albuterol sulfate hfa, azithromycin, diclofenac, fluoxetine, fluticasone, furosemide, gabapentin, bevespi aerosphere, hydrocodone-acetaminophen, hydroxyzine, levothyroxine, montelukast, oxybutynin, pantoprazole, promethazine, spironolactone, tizanidine, trazodone, and trelegy ellipta. and the patient is reported to be patient is compliant with medication protocol,  Medications are reported to be non-effective in controlling symptoms and changes have been made to the medication protocol.  Regarding these diagnoses, referrals were made to the  following provider(s):  n/a.  All notes on chart for PCP to review.    The patient was monitored remotely for activity level; mood & behavior; weight; medications.    The patient's physical needs include:  physical healthcare.     The patient's mental support needs include:  continued support    The patient's cognitive support needs include:  continued support; health care    The patient's psychosocial support needs include:  continued support    The patient's functional needs include: physical healthcare    The patient's environmental needs include:  inadequate living structure, currently living in a hotel    Care Plan overall comments:  reviewed    Refer to previous outreach notes for more information on the areas listed above.    Monthly Billing Diagnoses  (J41.8) Mixed simple and mucopurulent chronic bronchitis    (N18.31) Stage 3a chronic kidney disease    (G89.4) Chronic pain syndrome    Medications   Medications have been reconciled    Care Plan progress this month:      Recently Modified Care Plans Updates made since 11/27/2023 12:00 AM      No recently modified care plans.            Instructions   Patient was provided an electronic copy of care plan  CCM services were explained and offered and patient has accepted these services.  Patient has given their written consent to receive CCM services and understands that this includes the authorization of electronic communication of medical information with the other treating providers.  Patient understands that they may stop CCM services at any time and these changes will be effective at the end of the calendar month and will effectively revocate the agreement of CCM services.  Patient understands that only one practitioner can furnish and be paid for CCM services during one calendar month.  Patient also understands that there may be co-payment or deductible fees in association with CCM services.  Patient will continue with at least monthly follow-up calls with  the Ambulatory .    Meche WATSON  Ambulatory Case Management    12/28/2023, 11:37 EST

## 2024-01-25 RX ORDER — DICLOFENAC SODIUM 75 MG/1
TABLET, DELAYED RELEASE ORAL
Qty: 60 TABLET | Refills: 0 | Status: SHIPPED | OUTPATIENT
Start: 2024-01-25

## 2024-01-26 ENCOUNTER — TELEPHONE (OUTPATIENT)
Dept: FAMILY MEDICINE CLINIC | Facility: CLINIC | Age: 64
End: 2024-01-26

## 2024-01-26 NOTE — TELEPHONE ENCOUNTER
"Caller: Anastasiia Avilez    Relationship: Self    Best call back number: [phone]     What is the best time to reach you: ANY    Who are you requesting to speak with (clinical staff, provider,  specific staff member): DELFINA MEANS    Do you know the name of the person who called: ANASTASIIA    What was the call regarding: \"CONFIDENTIAL\"    PLEASE ADVISE  "

## 2024-01-29 NOTE — TELEPHONE ENCOUNTER
Pt Contacted:  Returning Pts call, Lyly Pt states that her vehicle got re-poed  and her wheel chair was in it and she can not get it back. Pt is requesting that you type up an order for a new Larger/Wider wheel chair anf fax to [facility]. If You do the order I will fax it, Pt was advised you a out of the office for the day and will be back on 01/30/2024

## 2024-01-30 NOTE — TELEPHONE ENCOUNTER
Caller: Anastasiia Avilez    Relationship to patient: Self    Best call back number: [phone]     Patient is needing: PATIENT CALLED BACK IN ABOUT THE STATUS OF HER MESSAGE ABOUT HER WHEELCHAIR    PLEASE ADVISE

## 2024-01-31 NOTE — TELEPHONE ENCOUNTER
Left Message: If Pt calls back ok for HUB to relay---Lyly has been notified to do this, once it is complete I will fax it to [facility]

## 2024-02-02 ENCOUNTER — TELEPHONE (OUTPATIENT)
Dept: FAMILY MEDICINE CLINIC | Facility: CLINIC | Age: 64
End: 2024-02-02

## 2024-02-02 NOTE — TELEPHONE ENCOUNTER
Left Message: If Pt calls back ok for HUB to relay---Odder for new wide wheelchair has been faxed to [facility] per Pts request.

## 2024-02-27 RX ORDER — PROMETHAZINE HYDROCHLORIDE 25 MG/1
TABLET ORAL
Qty: 30 TABLET | Refills: 3 | Status: SHIPPED | OUTPATIENT
Start: 2024-02-27

## 2024-03-04 ENCOUNTER — PATIENT OUTREACH (OUTPATIENT)
Dept: CASE MANAGEMENT | Facility: OTHER | Age: 64
End: 2024-03-04

## 2024-03-04 DIAGNOSIS — N18.31 STAGE 3A CHRONIC KIDNEY DISEASE: ICD-10-CM

## 2024-03-04 DIAGNOSIS — J41.8 MIXED SIMPLE AND MUCOPURULENT CHRONIC BRONCHITIS: Primary | ICD-10-CM

## 2024-03-04 DIAGNOSIS — G89.4 CHRONIC PAIN SYNDROME: ICD-10-CM

## 2024-03-04 NOTE — OUTREACH NOTE
AMBULATORY CASE MANAGEMENT NOTE    Name and Relationship of Patient/Support Person: Anastasiia Avilez - Self    Patient Outreach    Attempted to reach patient for CCM update. Left message for return call.     Education Documentation  No documentation found.        Meche WATSON  Ambulatory Case Management    3/4/2024, 15:44 EST

## 2024-03-05 ENCOUNTER — PATIENT OUTREACH (OUTPATIENT)
Dept: CASE MANAGEMENT | Facility: OTHER | Age: 64
End: 2024-03-05

## 2024-03-05 DIAGNOSIS — J41.8 MIXED SIMPLE AND MUCOPURULENT CHRONIC BRONCHITIS: Primary | ICD-10-CM

## 2024-03-05 DIAGNOSIS — E66.01 MORBID OBESITY: ICD-10-CM

## 2024-03-05 DIAGNOSIS — N18.31 STAGE 3A CHRONIC KIDNEY DISEASE: ICD-10-CM

## 2024-03-05 DIAGNOSIS — Z59.00 HOMELESSNESS: ICD-10-CM

## 2024-03-05 SDOH — ECONOMIC STABILITY - HOUSING INSECURITY: HOMELESSNESS UNSPECIFIED: Z59.00

## 2024-03-05 NOTE — OUTREACH NOTE
AMBULATORY CASE MANAGEMENT NOTE    Name and Relationship of Patient/Support Person: RaghavAnastasiia garcia - Self    CCM Interim Update    Spoke with Anastasiia for CCM update. She and her boyfriend are currently staying at the [facility]. She was staying at [facility] but due to her o2 use they did not feel this was a safe option. So the [facility] put them up for 10 days. The 10 days are finished but they are paying themselves to continue to stay. The  at [facility] is assisting them to try to get into the cottages. They are hopeful that this will work out. He no longer has his truck and that is not an option.     [place] [facility] [place] for COPD exacerbation. She finished her course of antibiotics while in the hospital. She currently is using her o2 more at night than during the day. She does not have a cpap or bipap. She reports o2 @3l/nc. She reports no edema currently and is taking her lasix. She states that the doctors at the hospital told her that she is becoming hypoxic when she is up and that is causing her heart to work harder. She states this scared her.    Patient denies any recent falls. States that she is currently using a walker with ambulation and is doing well with that.     Care Coordination    Patient has not had a pcp visit since being [place] [facility] [facility] scheduled follow up appointment with Lyly Means. Patient also needs labwork. Patient states that she will arrange bus transportation for this appointment.         Education Documentation  No documentation found.        Meche WATSON  Ambulatory Case Management    3/5/2024, 15:40 EST

## 2024-03-14 ENCOUNTER — OFFICE VISIT (OUTPATIENT)
Dept: FAMILY MEDICINE CLINIC | Facility: CLINIC | Age: 64
End: 2024-03-14
Payer: MEDICAID

## 2024-03-14 VITALS
SYSTOLIC BLOOD PRESSURE: 124 MMHG | HEIGHT: 69 IN | OXYGEN SATURATION: 90 % | BODY MASS INDEX: 49.18 KG/M2 | DIASTOLIC BLOOD PRESSURE: 72 MMHG | HEART RATE: 72 BPM

## 2024-03-14 DIAGNOSIS — R06.02 SHORTNESS OF BREATH: ICD-10-CM

## 2024-03-14 DIAGNOSIS — R07.2 PRECORDIAL PAIN: Primary | ICD-10-CM

## 2024-03-14 DIAGNOSIS — J41.8 MIXED SIMPLE AND MUCOPURULENT CHRONIC BRONCHITIS: ICD-10-CM

## 2024-03-14 DIAGNOSIS — E03.9 ACQUIRED HYPOTHYROIDISM: ICD-10-CM

## 2024-03-14 PROCEDURE — 1159F MED LIST DOCD IN RCRD: CPT | Performed by: PHYSICIAN ASSISTANT

## 2024-03-14 PROCEDURE — 99214 OFFICE O/P EST MOD 30 MIN: CPT | Performed by: PHYSICIAN ASSISTANT

## 2024-03-14 PROCEDURE — 1160F RVW MEDS BY RX/DR IN RCRD: CPT | Performed by: PHYSICIAN ASSISTANT

## 2024-03-14 RX ORDER — PREDNISONE 20 MG/1
3 TABLET ORAL DAILY
COMMUNITY
Start: 2023-12-30 | End: 2024-03-14

## 2024-03-14 RX ORDER — LEVOTHYROXINE SODIUM 0.07 MG/1
75 TABLET ORAL DAILY
Qty: 90 TABLET | Refills: 3 | Status: SHIPPED | OUTPATIENT
Start: 2024-03-14

## 2024-03-14 RX ORDER — FLUOXETINE HYDROCHLORIDE 40 MG/1
40 CAPSULE ORAL DAILY
Qty: 90 CAPSULE | Refills: 1 | Status: SHIPPED | OUTPATIENT
Start: 2024-03-14

## 2024-03-14 NOTE — PROGRESS NOTES
"Chief Complaint  Hospital Follow Up Visit    Subjective          Anastasiia Avilez presents to [facility]    History of Present Illness patient is here today for a hospital follow-up visit where she was recently in the emergency room for shortness of breath and chest discomfort.   I do not have the records and she is a poor historian and isn't able to tell me what they ultimately found except that it was her COPD and not her heart.  She says she is pretty much at her baseline right now but she is not wearing her oxygen today.  She is not faithful in wearing her continuous oxygen and she said she had not been wearing her oxygen at the time she went to the ER and she had also not been taking her pain medication because she \"did not feel like it.\"  She continues to smoke despite being advised not to and she has known sleep apnea but is not on a machine because at times they are homeless.  Recently they have been living in a local hotel but they tell me that tonight is her last night and they will be \"out on the street.\"  I suggested that they seek assistance through the proper channels and they have agreed to look into housing again but the last time they were offered housing it was almost $900 a month and they have to pay utilities and they did not think they could afford that.      Objective   Vital Signs:   /72 (BP Location: Left arm, Patient Position: Sitting, Cuff Size: Adult)   Pulse 72   Ht 175.3 cm (69\")   SpO2 90%   BMI 49.18 kg/m²     Body mass index is 49.18 kg/m².    Review of Systems   Constitutional:  Negative for chills, fatigue and fever.   Respiratory:  Positive for cough and shortness of breath. Negative for wheezing.    Cardiovascular:  Positive for leg swelling. Negative for chest pain and palpitations.   Musculoskeletal:  Negative for back pain and myalgias.   Neurological:  Negative for dizziness and headache.   Psychiatric/Behavioral:  Negative for depressed " mood. The patient is not nervous/anxious.        Past History:  Medical History: has a past medical history of Multilevel degenerative disc disease.   Surgical History: has no past surgical history on file.   Family History: family history is not on file.   Social History: reports that she has been smoking cigarettes. She has a 40 pack-year smoking history. She has never used smokeless tobacco. She reports that she does not currently use alcohol. She reports that she does not use drugs.      Current Outpatient Medications:     albuterol (PROVENTIL) (2.5 MG/3ML) 0.083% nebulizer solution, Take 2.5 mg by nebulization Every 4 (Four) Hours As Needed for Wheezing., Disp: 60 each, Rfl: 12    albuterol sulfate  (90 Base) MCG/ACT inhaler, Inhale 2 puffs Every 4 (Four) Hours As Needed for Wheezing., Disp: 18 g, Rfl: 12    FLUoxetine (PROzac) 40 MG capsule, Take 1 capsule by mouth Daily., Disp: 90 capsule, Rfl: 1    fluticasone (FLONASE) 50 MCG/ACT nasal spray, USE 2 SPRAYS IN EACH NOSTRIL DAILY AS DIRECTED, Disp: 48 g, Rfl: 5    furosemide (LASIX) 40 MG tablet, Take 1 tablet by mouth Daily., Disp: 90 tablet, Rfl: 1    gabapentin (NEURONTIN) 800 MG tablet, Take 1 tablet by mouth 3 times a day., Disp: , Rfl:     Glycopyrrolate-Formoterol (Bevespi Aerosphere) 9-4.8 MCG/ACT aerosol, Inhale 1 spray Daily., Disp: 10.7 g, Rfl: 5    HYDROcodone-acetaminophen (NORCO) 7.5-325 MG per tablet, Take 1 tablet by mouth 3 times a day., Disp: , Rfl:     hydrOXYzine (ATARAX) 25 MG tablet, Take 1 tablet by mouth 3 (Three) Times a Day., Disp: 90 tablet, Rfl: 3    levothyroxine (SYNTHROID, LEVOTHROID) 75 MCG tablet, Take 1 tablet by mouth Daily., Disp: 90 tablet, Rfl: 3    montelukast (SINGULAIR) 10 MG tablet, Take 1 tablet by mouth Every Night., Disp: 90 tablet, Rfl: 3    oxybutynin (DITROPAN) 5 MG tablet, Take 1 tablet by mouth 2 (Two) Times a Day., Disp: 60 tablet, Rfl: 5    pantoprazole (PROTONIX) 40 MG EC tablet, Take 1 tablet by  mouth Daily., Disp: 90 tablet, Rfl: 1    promethazine (PHENERGAN) 25 MG tablet, TAKE ONE TABLET BY MOUTH EVERY 6 HOURS AS NEEDED FOR NAUSEA AND/OR VOMITING, Disp: 30 tablet, Rfl: 3    spironolactone (ALDACTONE) 25 MG tablet, Take 1 tablet by mouth Daily., Disp: 90 tablet, Rfl: 3    traZODone (DESYREL) 150 MG tablet, TAKE 1 TO 2 TABLETS BY MOUTH EVERY NIGHT AT BEDTIME, Disp: 60 tablet, Rfl: 5    Trelegy Ellipta 100-62.5-25 MCG/ACT inhaler, Inhale 1 puff Daily., Disp: , Rfl:     Allergies: Sulfa antibiotics and Adhesive tape    Physical Exam  Constitutional:       Appearance: She is obese.   Cardiovascular:      Rate and Rhythm: Normal rate and regular rhythm.      Heart sounds: Normal heart sounds.   Pulmonary:      Effort: Pulmonary effort is normal. No respiratory distress.      Breath sounds: Decreased breath sounds present. No wheezing, rhonchi or rales.   Musculoskeletal:      Right lower leg: 3+ Pitting Edema present.      Left lower leg: 3+ Pitting Edema present.   Neurological:      Mental Status: She is alert and oriented to person, place, and time.   Psychiatric:         Behavior: Behavior normal.          Result Review :                   Assessment and Plan    Diagnoses and all orders for this visit:    1. Precordial pain (Primary)  Assessment & Plan:  I am going to refer her back to cardiology for further evaluation of this I think there is a component of congestive heart failure     Orders:  -     Ambulatory Referral to Cardiology    2. Shortness of breath  Assessment & Plan:  She really needs to utilize her oxygen on a regular basis however once they have to leave the hotel unless she has a facility that she can go to I am not sure she is going to be able to continue use of the oxygen and I talk to them today strongly about approaching appropriate places gave them information about assistance for housing, the access shelter for him in the [facility] for her and they tell me they are aware of all these  places and I encouraged them to reach out to these people again and get the assistance that they need I also told them that they may have to forfeit other Luxuries in order to afford housing.  At one time they did have a vehicle and now they are telling me they no longer have the vehicle to fall back on.    Orders:  -     Ambulatory Referral to Cardiology    3. Acquired hypothyroidism  Assessment & Plan:  She said she needed thyroid medicine refilled I did refill this and we will get some screening blood work further recommendations will depend upon those results.    Orders:  -     Comprehensive Metabolic Panel; Future  -     TSH; Future  -     Comprehensive Metabolic Panel  -     TSH    4. Mixed simple and mucopurulent chronic bronchitis  Assessment & Plan:  I suspect this is a large part of her overall problems but she does not wear her oxygen consistently as instructed by the pulmonologist she does have a nebulizer and inhalers but she is not consistent with their usage and she continues to smoke.  Today I talked her about taking ownership of her own health and making changes such as quitting smoking to improve her overall health condition while she expressed understanding I am not convinced she is ready to quit smoking.      Other orders  -     levothyroxine (SYNTHROID, LEVOTHROID) 75 MCG tablet; Take 1 tablet by mouth Daily.  Dispense: 90 tablet; Refill: 3  -     FLUoxetine (PROzac) 40 MG capsule; Take 1 capsule by mouth Daily.  Dispense: 90 capsule; Refill: 1        Follow Up   No follow-ups on file.  Patient was given instructions and counseling regarding her condition or for health maintenance advice. Please see specific information pulled into the AVS if appropriate.     Radha Valladares PA-C

## 2024-03-15 NOTE — ASSESSMENT & PLAN NOTE
I am going to refer her back to cardiology for further evaluation of this I think there is a component of congestive heart failure

## 2024-03-15 NOTE — ASSESSMENT & PLAN NOTE
Patient:   BAQIR, MAHBOUBEH            MRN: [id number]            FIN: [id number]              Age:   69 years     Sex:  FEMALE     :  51   Associated Diagnoses:   None   Author:   JOSE ANGEL SANCHEZ     Progress Note:   Patient seen and examined.   No pauses overnight. Now on 3 mcg of dopamine.   Liver US shows pericardial effusion. 2D echcocardiogram pending.    Home Medications (9) Active  aspirin 81 mg oral tablet 81 mg = 1 tab, Oral, Daily  Eliquis oral 5 mg tablet 5 mg = 1 tab, Oral, BID  Fish Oil 1000 mg oral capsule 1,000 mg = 1 cap, Oral, Daily  flecainide oral 50 mg tablet (Tambocor) 50 mg = 1 tab, Oral, Q12H  magnesium oxide 500 mg oral tablet 500 mg = 1 tab, Oral, Daily  Metoprolol Tartrate 25 mg oral tablet 12.5 mg = 0.5 tab, Oral, BID  Oysco 500 (1250 mg calcium carbonate) oral tablet 1,250 mg = 1 tab, Oral, BID  raNITIdine 150 mg oral tablet 150 mg = 1 tab, Oral, BID  Vitamin D3 5000 intl units oral tablet 5,000 unit = 1 tab, Oral, Daily  Medications (9) Active  Scheduled: (5)  *Potassium Protocol Communication  1 each, N/A, Daily  ApixaBAN 5 mg tab  5 mg 1 tab, Oral, BID  Aspirin 81 mg DR tab  81 mg 1 tab, Oral, Daily  Famotidine 20 mg tab  20 mg 1 tab, Oral, Q12H  Sodium chloride PF 0.9% flush inj 10 mL  20 mL, Flush, Q12H  Continuous: (1)  DOPamine 800 mg [5 mcg/kg/min] + premixed in Dextrose 5% 250 mL  250 mL, IV, 5.85 mL/hr  PRN: (3)  Ondansetron 4 mg/2 mL inj SDV  4 mg 2 mL, Slow IV Push, Q6H  Sodium chloride PF 0.9% flush inj 10 mL  10 mL, Flush, As Directed PRN  Sodium chloride PF 0.9% flush inj 10 mL  20 mL, Flush, As Directed PRN  Allergies (1) Active Reaction  NKA None Documented  Vitals between:   2019 14:24:44   TO   2019 14:24:44                   LAST RESULT MINIMUM MAXIMUM  Temperature 36.6 36.3 37.5  Heart Rate 70 70 78  Respiratory Rate 13 11 28  NISBP           82 82 136  NIDBP           48 48 88  NIMBP           58 58 95  SpO2                    97 94  I suspect this is a large part of her overall problems but she does not wear her oxygen consistently as instructed by the pulmonologist she does have a nebulizer and inhalers but she is not consistent with their usage and she continues to smoke.  Today I talked her about taking ownership of her own health and making changes such as quitting smoking to improve her overall health condition while she expressed understanding I am not convinced she is ready to quit smoking.   98  Physical exam:  Exam:  General: The patient is alert and oriented ×3, in no acute distress.  She is well-nourished and appears euvolemic.    Head:  Normocephalic  Eyes: Pupils are reactive and bilaterally symmetric.  No scleral icterus was seen.  EOMI    Mouth:  Oral mucosa is moist.  There were no oral ulcers or pharyngeal exudates seen.  Neck: Supple.  No increased jugular venous distention.  No cervical or supraclavicular lymphadenopathy was noted.  Good carotid upstroke   Cardiac: Heart is regular rate and rhythm without murmur, normal S3, S4  Lungs: Lungs are clear to auscultation bilaterally.  Abdomen: Soft, nontender, bowel sounds are normoactive.  No masses or organomegaly was observed  Musculoskeletal: Calves are soft and symmetric.  Negative Homans sign   Neuro: Brief neurologic exam assessing strength, coordination, and sensation is grossly intact  Vasc: Dorsalis pedis pulses are palpable bilaterally.  Capillary refill is less than 2 seconds in the toes.  Psych: Normal affect  Skin: No pathologic skin changes were seen   Labs:     Labs between:  20-JUL-2019 14:24 to 21-JUL-2019 14:24  CBC:                 WBC  HgB  Hct  Plt  MCV  RDW   21-JUL-2019 7.7  (L) 11.8  (L) 34.7  202  87.0  13.8   DIFF:                 Seg  Neutroph//ABS  Lymph//ABS  Mono//ABS  EOS/ABS  21-JUL-2019 NOT APPLICABLE  63 // 4.9 22 // 1.7 11 // 0.8 3 // 0.2  BMP:                 Na  Cl  BUN  Glu   21-JUL-2019 138  106  (L) 4  (H) 118                              K  CO2  Cr  Ca                              3.6  26  0.67  8.8   CMP:                 AST  ALT  AlkPhos  Bili  Albumin   21-JUL-2019 (H) 106  (H) 91  (H) 140  0.7  (L) 3.4   Other Chem:             Mg  Phos  Triglycerides  GGTP  DirectBili                           (H) 2.5  3.4                        Impression/plan:  Syncope secondary to sinus node dysfunction  Patient noted to be bradycardic with junctional rhythm.  No pauses overnight.  Improvement with dopamine.  Still  pacemacker is recommended. She had mutliple 6 second pauses.  Echocardiogram pending. Pericardial effusion on Liver US.   -Cardiology on board  Elevated troponins  -Cardiology feels is secondary to recent ablation.  -Troponins downtrending.  Patient with no chest pain at this time.  Proximal atrial fibrillation status post ablation last week  -Eliquis in hold  -Cardiology on board  Transaminitis- Improving   -Liver ultrasound normal.  Hep panel  negative. Likely secondary to hypotension. Cont to trend . CMP in the AM.  SCDs  Physician Name:  JADYN CROCKER  Specialty :  INTERNAL MEDICINE  Primary Care Physician    Physician Name:  JADYN CROCKER  Specialty :  INTERNAL MEDICINE  Consulting Physicians   Physician Name:  PATI ISAAC Speciality:  CARDIOLOGY Consult Reason:  needs ppm insertion  Physician Name:  TRIPP BARRY Speciality:  CARDIOLOGY Consult Reason:  junctional bradycardia with resultant vtach

## 2024-03-15 NOTE — ASSESSMENT & PLAN NOTE
She said she needed thyroid medicine refilled I did refill this and we will get some screening blood work further recommendations will depend upon those results.

## 2024-03-15 NOTE — ASSESSMENT & PLAN NOTE
She really needs to utilize her oxygen on a regular basis however once they have to leave the hotel unless she has a facility that she can go to I am not sure she is going to be able to continue use of the oxygen and I talk to them today strongly about approaching appropriate places gave them information about assistance for housing, the access shelter [facility] him in the [facility] for her and they tell me they are aware of all these places and I encouraged them to reach out to these people again and get the assistance that they need I also told them that they may have to forfeit other Luxuries in order to afford housing.  At one time they did have a vehicle and now they are telling me they no longer have the vehicle to fall back on.

## 2024-03-19 ENCOUNTER — TELEPHONE (OUTPATIENT)
Dept: FAMILY MEDICINE CLINIC | Facility: CLINIC | Age: 64
End: 2024-03-19
Payer: MEDICAID

## 2024-03-19 NOTE — TELEPHONE ENCOUNTER
Caller: Anastasiia Avilez    Relationship: Self    Best call back number: [phone]     What test/procedure requested: MEDICATION     Glycopyrrolate-Formoterol (Bevespi Aerosphere) 9-4.8 MCG/ACT aerosol     When is it needed: STAT    Where is the test/procedure going to be performed: [facility] [id number] - [place], [place] - [address] AT [address] - [phone]  - [phone] FX     Additional information or concerns: PATIENT CALLED STATING THAT HER PHARMACY INFORMED HER THAT HER INSURANCE REQUIRES A PRIOR AUTHORIZATION FOR THIS MEDICATION. SHE HAS BEEN OUT OF IT FOR A FEW MONTHS AND SHE REALLY NEEDS IT AS IT HELPS WITH HER COPD. PLEASE ADVISE

## 2024-03-28 ENCOUNTER — PATIENT OUTREACH (OUTPATIENT)
Dept: CASE MANAGEMENT | Facility: OTHER | Age: 64
End: 2024-03-28
Payer: MEDICAID

## 2024-03-28 DIAGNOSIS — J41.8 MIXED SIMPLE AND MUCOPURULENT CHRONIC BRONCHITIS: Primary | ICD-10-CM

## 2024-03-28 DIAGNOSIS — N18.31 STAGE 3A CHRONIC KIDNEY DISEASE: ICD-10-CM

## 2024-03-28 DIAGNOSIS — E66.01 MORBID OBESITY: ICD-10-CM

## 2024-03-28 NOTE — OUTREACH NOTE
[facility] End of Month Documentation    This Chronic Medical Management Care Plan for Anastasiia Avilez, 63 y.o. female, has been monitored and managed; reviewed and a new plan of care implemented for the month of March.  A cumulative time of 25  minutes was spent on this patient record this month, including phone call with patient; chart review.    Regarding the patient's problems: has Shortness of breath; Multilevel degenerative disc disease; Stage 3a chronic kidney disease; Lumbar radiculopathy; Lumbosacral spondylosis without myelopathy; Anxiety and depression; Chronic obstructive pulmonary disease; Chronic pain; Gastroesophageal reflux disease; Hilar lymphadenopathy; Morbid obesity; Narcotic drug use; Obstructive sleep apnea syndrome; Hypothyroidism; Peripheral edema; Pleural effusion; Poor historian; Smoker; Suspected CHF (congestive heart failure); Unspecified cirrhosis of liver; Overactive bladder; Pneumonia due to infectious organism; Homelessness; Hypokalemia; and Precordial pain on their problem list., the following items were addressed: medical records; medications and any changes can be found within the plan section of the note.  A detailed listing of time spent for chronic care management is tracked within each outreach encounter.  Current medications include:  has a current medication list which includes the following prescription(s): albuterol, albuterol sulfate hfa, fluoxetine, fluticasone, furosemide, gabapentin, bevespi aerosphere, hydrocodone-acetaminophen, hydroxyzine, levothyroxine, montelukast, oxybutynin, pantoprazole, promethazine, spironolactone, trazodone, and trelegy ellipta. and the patient is reported to be patient is noncompliant with medication protocol, patient has several medications that have not been filled recently,  Medications are reported to be non-effective in controlling symptoms and changes have been made to the medication protocol, recent [facility] stay for Copd exacerbation.  Regarding  these diagnoses, referrals were made to the following provider(s):  n/a.  All notes on chart for PCP to review.    The patient was monitored remotely for mood & behavior; medications; pain; weight.    The patient's physical needs include:  physical healthcare.     The patient's mental support needs include:  continued support    The patient's cognitive support needs include:  continued support; health care    The patient's psychosocial support needs include:  continued support    The patient's functional needs include: physical healthcare    The patient's environmental needs include:  inadequate living structure, currently living in a hotel    Care Plan overall comments:  reviewed    Refer to previous outreach notes for more information on the areas listed above.    Monthly Billing Diagnoses  (J41.8) Mixed simple and mucopurulent chronic bronchitis    (N18.31) Stage 3a chronic kidney disease    (E66.01) Morbid obesity    Medications   Medications have been reconciled    Care Plan progress this month:      Recently Modified Care Plans Updates made since 2/26/2024 12:00 AM      No recently modified care plans.            Instructions   Patient was provided an electronic copy of care plan  CCM services were explained and offered and patient has accepted these services.  Patient has given their written consent to receive CCM services and understands that this includes the authorization of electronic communication of medical information with the other treating providers.  Patient understands that they may stop CCM services at any time and these changes will be effective at the end of the calendar month and will effectively revocate the agreement of CCM services.  Patient understands that only one practitioner can furnish and be paid for CCM services during one calendar month.  Patient also understands that there may be co-payment or deductible fees in association with CCM services.  Patient will continue with at least  monthly follow-up calls with the Ambulatory .    Meche WATSON  Ambulatory Case Management    3/28/2024, 16:01 EDT

## 2024-04-04 ENCOUNTER — TELEPHONE (OUTPATIENT)
Dept: CASE MANAGEMENT | Facility: OTHER | Age: 64
End: 2024-04-04
Payer: MEDICAID

## 2024-04-05 ENCOUNTER — PATIENT OUTREACH (OUTPATIENT)
Dept: CASE MANAGEMENT | Facility: OTHER | Age: 64
End: 2024-04-05
Payer: MEDICAID

## 2024-04-05 DIAGNOSIS — J41.8 MIXED SIMPLE AND MUCOPURULENT CHRONIC BRONCHITIS: Primary | ICD-10-CM

## 2024-04-05 DIAGNOSIS — N18.31 STAGE 3A CHRONIC KIDNEY DISEASE: ICD-10-CM

## 2024-04-05 DIAGNOSIS — E66.01 MORBID OBESITY: ICD-10-CM
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
## 2024-04-15 RX ORDER — PROMETHAZINE HYDROCHLORIDE 25 MG/1
TABLET ORAL
Qty: 30 TABLET | Refills: 3 | Status: SHIPPED | OUTPATIENT
Start: 2024-04-15

## 2024-05-15 ENCOUNTER — TELEPHONE (OUTPATIENT)
Dept: FAMILY MEDICINE CLINIC | Facility: CLINIC | Age: 64
End: 2024-05-15
Payer: MEDICAID

## 2024-05-15 NOTE — TELEPHONE ENCOUNTER
Caller: Anastasiia Avilez    Relationship: Self    Best call back number:[phone]       What was the call regarding: [facility] FOR KNEE PAIN. WAS TOLD MAYBE A TEAR OR PULL IN MUSCLE OR TENDON. NEEDS A MRI AND REFERRAL TO ORTHO. SET UP HOSPITAL FOLLOW UP APPOINTMENT BUT WANTS TO SEE IF WE CAN GO ON AND ORDER THE MRI NOW. PLEASE CALL TO DISCUSS. ALSO WANTS ORTHO APPOINTMENT ASAP BUT THEY CALLED ONE IN [place] AND CAN'T GET IN TILL THE 29TH.

## 2024-05-16 NOTE — TELEPHONE ENCOUNTER
Pt Contacted; Returning Pts call, Pt advised she will have to wait until her appt before we can do a referral or order any further testing. Pt verbalized understanding.

## 2024-05-20 RX ORDER — PROMETHAZINE HYDROCHLORIDE 25 MG/1
25 TABLET ORAL EVERY 8 HOURS PRN
Qty: 30 TABLET | Refills: 3 | Status: SHIPPED | OUTPATIENT
Start: 2024-05-20

## 2024-05-20 NOTE — TELEPHONE ENCOUNTER
Caller: Anastasiia Avilez    Relationship: Self    Best call back number: [phone]     Requested Prescriptions:   Requested Prescriptions     Pending Prescriptions Disp Refills    promethazine (PHENERGAN) 25 MG tablet 30 tablet 3        Pharmacy where request should be sent: [facility] [id number] - [place], [place] - [address] [facility] AT [facility] [address] - [phone]  - [phone] FX     Last office visit with prescribing clinician: 3/14/2024   Last telemedicine visit with prescribing clinician: Visit date not found   Next office visit with prescribing clinician: Visit date not found     Does the patient have less than a 3 day supply:  [x] Yes  [] No    Would you like a call back once the refill request has been completed: [] Yes [x] No    If the office needs to give you a call back, can they leave a voicemail: [] Yes [x] No    Evelyn Jones Rep   05/20/24 15:55 EDT

## 2024-05-29 ENCOUNTER — TELEPHONE (OUTPATIENT)
Dept: CASE MANAGEMENT | Facility: OTHER | Age: 64
End: 2024-05-29
Payer: MEDICAID

## 2024-06-04 ENCOUNTER — TELEPHONE (OUTPATIENT)
Dept: CASE MANAGEMENT | Facility: OTHER | Age: 64
End: 2024-06-04
Payer: MEDICAID

## 2024-06-04 NOTE — TELEPHONE ENCOUNTER
Attempted to reach patient for CCM update. Left message for return call [phone].     This is second utr attempt.

## 2024-06-11 ENCOUNTER — TELEPHONE (OUTPATIENT)
Dept: CASE MANAGEMENT | Facility: OTHER | Age: 64
End: 2024-06-11
Payer: MEDICAID

## 2024-06-11 NOTE — TELEPHONE ENCOUNTER
Attempted to reach patient for CCM update. Left message for return call.     This is third Utr attempt. Will change to monitoring at this time.

## 2024-07-01 ENCOUNTER — TELEPHONE (OUTPATIENT)
Dept: FAMILY MEDICINE CLINIC | Facility: CLINIC | Age: 64
End: 2024-07-01
Payer: MEDICAID

## 2024-07-01 NOTE — TELEPHONE ENCOUNTER
Left Message: Returning Pts call, if Pt calls back ok for HUB to relay, please advise that Lyly does not write for diet pills.

## 2024-07-01 NOTE — TELEPHONE ENCOUNTER
Caller: Anastasiia Avilez    Relationship: Self    Best call back number: [phone]     What is the best time to reach you: ANYTIME    Who are you requesting to speak with (clinical staff, provider,  specific staff member): DELFINA MEANS    Do you know the name of the person who called: ANASTASIIA    What was the call regarding: PATIENT IS ASKING TO CALL HER BACK TO DISCUSS A DIET PILL      Is it okay if the provider responds through MyChart: NO PLEASE CALL

## 2024-07-15 ENCOUNTER — TELEPHONE (OUTPATIENT)
Dept: CASE MANAGEMENT | Facility: OTHER | Age: 64
End: 2024-07-15
Payer: MEDICAID

## 2024-07-22 RX ORDER — PROMETHAZINE HYDROCHLORIDE 25 MG/1
25 TABLET ORAL EVERY 8 HOURS PRN
Qty: 20 TABLET | Refills: 0 | Status: SHIPPED | OUTPATIENT
Start: 2024-07-22

## 2024-07-22 NOTE — TELEPHONE ENCOUNTER
I CALLED PATIENT AND LET HER KNOW THAT SHE WILL HAVE TO GET AN APPT SET UP FOR THE MEDICATION TO BE FILLED IN THE FUTURE, SHE VERBALLY UNDERSTOOD AND STATED SHE WOULD CALL BACK TO GET AN APPT.

## 2024-07-22 NOTE — TELEPHONE ENCOUNTER
Caller: Anastasiia Avilez    Relationship: Self    Best call back number: [phone]    Requested Prescriptions:   Requested Prescriptions     Pending Prescriptions Disp Refills    promethazine (PHENERGAN) 25 MG tablet 30 tablet 3     Sig: Take 1 tablet by mouth Every 8 (Eight) Hours As Needed for Nausea or Vomiting.        Pharmacy where request should be sent: [facility] [id number] - [facility] [place] - [address] AT [facility] [address] - [phone] [facility] [phone] FX     Last office visit with prescribing clinician: 3/14/2024   Last telemedicine visit with prescribing clinician: Visit date not found   Next office visit with prescribing clinician: Visit date not found     Additional details provided by patient:     Evelyn Kan Rep   07/22/24 09:34 EDT

## 2024-07-23 ENCOUNTER — TELEPHONE (OUTPATIENT)
Dept: FAMILY MEDICINE CLINIC | Facility: CLINIC | Age: 64
End: 2024-07-23

## 2024-07-23 NOTE — TELEPHONE ENCOUNTER
Caller: Anastasiia Avilez    Relationship: Self    Best call back number: [phone]    What form or medical record are you requesting: TRANSPORTATION PAPERWORK FROM [facility]     Who is requesting this form or medical record from you: THE PATIENT ANASTASIIA     How would you like to receive the form: FAX    FAX NUMBER ON THE PAPERWORK     Timeframe paperwork needed: THE PATIENT STATED THAT [facility] ADVISED HER THAT THEY FAXED PAPERWORK TO THE PRACTICE 07/16/2024 FOR TRANSPORTATION TO AN APPOINTMENT THAT THE PATIENT HAS ON 07/30/2024, AND STATED THEY HAVE NOT RECEIVED THE PAPERWORK BACK FROM THE PRACTICE.     Additional notes: PLEASE CALL THE PATIENT AND ADVISE.

## 2024-08-06 ENCOUNTER — OFFICE VISIT (OUTPATIENT)
Dept: FAMILY MEDICINE CLINIC | Facility: CLINIC | Age: 64
End: 2024-08-06
Payer: MEDICAID

## 2024-08-06 VITALS
HEART RATE: 81 BPM | WEIGHT: 293 LBS | DIASTOLIC BLOOD PRESSURE: 86 MMHG | SYSTOLIC BLOOD PRESSURE: 140 MMHG | OXYGEN SATURATION: 90 % | BODY MASS INDEX: 51.39 KG/M2

## 2024-08-06 DIAGNOSIS — E66.01 MORBID OBESITY: ICD-10-CM

## 2024-08-06 DIAGNOSIS — R60.0 PERIPHERAL EDEMA: ICD-10-CM

## 2024-08-06 DIAGNOSIS — N18.31 STAGE 3A CHRONIC KIDNEY DISEASE: ICD-10-CM

## 2024-08-06 DIAGNOSIS — E87.6 HYPOKALEMIA: ICD-10-CM

## 2024-08-06 DIAGNOSIS — E03.9 ACQUIRED HYPOTHYROIDISM: Primary | ICD-10-CM

## 2024-08-06 DIAGNOSIS — K21.9 GASTROESOPHAGEAL REFLUX DISEASE, UNSPECIFIED WHETHER ESOPHAGITIS PRESENT: ICD-10-CM

## 2024-08-06 PROCEDURE — 1160F RVW MEDS BY RX/DR IN RCRD: CPT | Performed by: PHYSICIAN ASSISTANT

## 2024-08-06 PROCEDURE — 99214 OFFICE O/P EST MOD 30 MIN: CPT | Performed by: PHYSICIAN ASSISTANT

## 2024-08-06 PROCEDURE — 1159F MED LIST DOCD IN RCRD: CPT | Performed by: PHYSICIAN ASSISTANT

## 2024-08-06 PROCEDURE — 90677 PCV20 VACCINE IM: CPT | Performed by: PHYSICIAN ASSISTANT

## 2024-08-06 PROCEDURE — 90471 IMMUNIZATION ADMIN: CPT | Performed by: PHYSICIAN ASSISTANT

## 2024-08-06 PROCEDURE — 1125F AMNT PAIN NOTED PAIN PRSNT: CPT | Performed by: PHYSICIAN ASSISTANT

## 2024-08-06 RX ORDER — TIZANIDINE 4 MG/1
4 TABLET ORAL 3 TIMES DAILY PRN
Qty: 270 TABLET | Refills: 3 | Status: SHIPPED | OUTPATIENT
Start: 2024-08-06

## 2024-08-06 RX ORDER — FLUTICASONE FUROATE, UMECLIDINIUM BROMIDE AND VILANTEROL TRIFENATATE 100; 62.5; 25 UG/1; UG/1; UG/1
1 POWDER RESPIRATORY (INHALATION) DAILY
Qty: 1 EACH | Refills: 5 | Status: SHIPPED | OUTPATIENT
Start: 2024-08-06

## 2024-08-06 RX ORDER — SPIRONOLACTONE 25 MG/1
25 TABLET ORAL DAILY
Qty: 90 TABLET | Refills: 3 | Status: SHIPPED | OUTPATIENT
Start: 2024-08-06

## 2024-08-06 RX ORDER — ONDANSETRON 4 MG/1
4 TABLET, ORALLY DISINTEGRATING ORAL EVERY 8 HOURS PRN
Qty: 90 TABLET | Refills: 1 | Status: SHIPPED | OUTPATIENT
Start: 2024-08-06

## 2024-08-06 RX ORDER — LEVOTHYROXINE SODIUM 0.07 MG/1
75 TABLET ORAL DAILY
Qty: 90 TABLET | Refills: 3 | Status: SHIPPED | OUTPATIENT
Start: 2024-08-06

## 2024-08-06 RX ORDER — ALBUTEROL SULFATE 90 UG/1
2 AEROSOL, METERED RESPIRATORY (INHALATION) EVERY 4 HOURS PRN
Qty: 18 G | Refills: 12 | Status: SHIPPED | OUTPATIENT
Start: 2024-08-06

## 2024-08-06 RX ORDER — OXYBUTYNIN CHLORIDE 5 MG/1
5 TABLET ORAL 2 TIMES DAILY
Qty: 180 TABLET | Refills: 3 | Status: SHIPPED | OUTPATIENT
Start: 2024-08-06

## 2024-08-06 RX ORDER — TIZANIDINE 4 MG/1
1 TABLET ORAL 3 TIMES DAILY PRN
COMMUNITY
End: 2024-08-06 | Stop reason: SDUPTHER

## 2024-08-06 RX ORDER — NYSTATIN 100000 [USP'U]/G
POWDER TOPICAL 3 TIMES DAILY
Qty: 60 G | Refills: 5 | Status: SHIPPED | OUTPATIENT
Start: 2024-08-06

## 2024-08-06 RX ORDER — MELOXICAM 7.5 MG/1
TABLET ORAL
COMMUNITY
Start: 2024-04-29

## 2024-08-06 RX ORDER — ALBUTEROL SULFATE 2.5 MG/3ML
2.5 SOLUTION RESPIRATORY (INHALATION) EVERY 4 HOURS PRN
Qty: 60 EACH | Refills: 12 | Status: SHIPPED | OUTPATIENT
Start: 2024-08-06

## 2024-08-06 RX ORDER — HYDROXYZINE HYDROCHLORIDE 25 MG/1
25 TABLET, FILM COATED ORAL 3 TIMES DAILY
Qty: 90 TABLET | Refills: 3 | Status: SHIPPED | OUTPATIENT
Start: 2024-08-06

## 2024-08-06 RX ORDER — TRAZODONE HYDROCHLORIDE 150 MG/1
TABLET ORAL
Qty: 180 TABLET | Refills: 3 | Status: SHIPPED | OUTPATIENT
Start: 2024-08-06

## 2024-08-06 RX ORDER — PSEUDOEPHEDRINE HCL 30 MG
TABLET ORAL
COMMUNITY
End: 2024-08-06 | Stop reason: SDUPTHER

## 2024-08-06 RX ORDER — PANTOPRAZOLE SODIUM 40 MG/1
40 TABLET, DELAYED RELEASE ORAL DAILY
Qty: 90 TABLET | Refills: 3 | Status: SHIPPED | OUTPATIENT
Start: 2024-08-06

## 2024-08-06 RX ORDER — NYSTATIN 100000 [USP'U]/G
POWDER TOPICAL
COMMUNITY
Start: 2024-05-24 | End: 2024-08-06 | Stop reason: SDUPTHER

## 2024-08-06 RX ORDER — MOMETASONE FUROATE AND FORMOTEROL FUMARATE DIHYDRATE 200; 5 UG/1; UG/1
AEROSOL RESPIRATORY (INHALATION)
COMMUNITY
Start: 2024-05-24 | End: 2024-08-06

## 2024-08-06 RX ORDER — MONTELUKAST SODIUM 10 MG/1
10 TABLET ORAL NIGHTLY
Qty: 90 TABLET | Refills: 3 | Status: SHIPPED | OUTPATIENT
Start: 2024-08-06

## 2024-08-06 RX ORDER — FLUOXETINE HYDROCHLORIDE 40 MG/1
40 CAPSULE ORAL DAILY
Qty: 90 CAPSULE | Refills: 3 | Status: SHIPPED | OUTPATIENT
Start: 2024-08-06

## 2024-08-06 RX ORDER — FUROSEMIDE 40 MG/1
40 TABLET ORAL DAILY
Qty: 90 TABLET | Refills: 3 | Status: SHIPPED | OUTPATIENT
Start: 2024-08-06

## 2024-08-06 RX ORDER — PSEUDOEPHEDRINE HCL 30 MG
100 TABLET ORAL DAILY
Qty: 90 CAPSULE | Refills: 3 | Status: SHIPPED | OUTPATIENT
Start: 2024-08-06

## 2024-08-06 RX ORDER — FLUTICASONE PROPIONATE 50 MCG
2 SPRAY, SUSPENSION (ML) NASAL DAILY
Qty: 48 G | Refills: 5 | Status: SHIPPED | OUTPATIENT
Start: 2024-08-06

## 2024-08-06 NOTE — ASSESSMENT & PLAN NOTE
Continue present care no changes meds refilled. Routine screening labs ordered. Further recommendations will depend upon those results.

## 2024-08-06 NOTE — ASSESSMENT & PLAN NOTE
Patient's (Body mass index is 51.39 kg/m².) indicates that they are morbidly/severely obese (BMI > 40 or > 35 with obesity - related health condition) with health conditions that include obstructive sleep apnea, hypertension, dyslipidemias, osteoarthritis, and lower extremity venous stasis disease . Weight is unchanged. BMI  is above average; BMI management plan is completed. We discussed low calorie, low carb based diet program, portion control, increasing exercise, and patient is not interested in weight management program at this time .

## 2024-08-06 NOTE — ASSESSMENT & PLAN NOTE
Renal condition is stable.  Continue current treatment regimen.  Routine screening labs ordered. Further recommendations will depend upon those results.   Renal condition will be reassessed in 6 months.

## 2024-08-06 NOTE — PROGRESS NOTES
Chief Complaint  Med Refill    Subjective          Anastasiia Avilez presents to [facility] PRIMARY CARE    History of Present Illness patient is here today for medication refills and she has no specific complaints.  She reports that she and her  are now living in a hotel room so they at least have electricity and air conditioning in this heat.    Objective   Vital Signs:   /86   Pulse 81   Wt (!) 158 kg (348 lb)   SpO2 90%   BMI 51.39 kg/m²     Body mass index is 51.39 kg/m².    Review of Systems   Constitutional:  Negative for chills, fatigue and fever.   Respiratory:  Negative for cough and shortness of breath.    Cardiovascular:  Negative for chest pain and palpitations.   Musculoskeletal:  Negative for back pain and myalgias.   Neurological:  Negative for dizziness and headache.   Psychiatric/Behavioral:  Negative for depressed mood. The patient is not nervous/anxious.        Past History:  Medical History: has a past medical history of Multilevel degenerative disc disease.   Surgical History: has no past surgical history on file.   Family History: family history is not on file.   Social History: reports that she has been smoking cigarettes. She has a 40 pack-year smoking history. She has never used smokeless tobacco. She reports that she does not currently use alcohol. She reports that she does not use drugs.      Current Outpatient Medications:     albuterol (PROVENTIL) (2.5 MG/3ML) 0.083% nebulizer solution, Take 2.5 mg by nebulization Every 4 (Four) Hours As Needed for Wheezing., Disp: 60 each, Rfl: 12    albuterol sulfate  (90 Base) MCG/ACT inhaler, Inhale 2 puffs Every 4 (Four) Hours As Needed for Wheezing., Disp: 18 g, Rfl: 12    docusate sodium 100 MG capsule, Take 1 capsule by mouth Daily., Disp: 90 capsule, Rfl: 3    FLUoxetine (PROzac) 40 MG capsule, Take 1 capsule by mouth Daily., Disp: 90 capsule, Rfl: 3    fluticasone (FLONASE) 50 MCG/ACT nasal spray, 2 sprays  by Each Nare route Daily., Disp: 48 g, Rfl: 5    furosemide (LASIX) 40 MG tablet, Take 1 tablet by mouth Daily., Disp: 90 tablet, Rfl: 3    gabapentin (NEURONTIN) 800 MG tablet, Take 1 tablet by mouth 3 times a day., Disp: , Rfl:     HYDROcodone-acetaminophen (NORCO) 7.5-325 MG per tablet, Take 1 tablet by mouth 3 times a day., Disp: , Rfl:     hydrOXYzine (ATARAX) 25 MG tablet, Take 1 tablet by mouth 3 (Three) Times a Day., Disp: 90 tablet, Rfl: 3    levothyroxine (SYNTHROID, LEVOTHROID) 75 MCG tablet, Take 1 tablet by mouth Daily., Disp: 90 tablet, Rfl: 3    meloxicam (MOBIC) 7.5 MG tablet, , Disp: , Rfl:     montelukast (SINGULAIR) 10 MG tablet, Take 1 tablet by mouth Every Night., Disp: 90 tablet, Rfl: 3    nystatin (MYCOSTATIN) 669383 UNIT/GM powder, Apply  topically to the appropriate area as directed 3 (Three) Times a Day., Disp: 60 g, Rfl: 5    oxybutynin (DITROPAN) 5 MG tablet, Take 1 tablet by mouth 2 (Two) Times a Day., Disp: 180 tablet, Rfl: 3    pantoprazole (PROTONIX) 40 MG EC tablet, Take 1 tablet by mouth Daily., Disp: 90 tablet, Rfl: 3    spironolactone (ALDACTONE) 25 MG tablet, Take 1 tablet by mouth Daily., Disp: 90 tablet, Rfl: 3    tiZANidine (ZANAFLEX) 4 MG tablet, Take 1 tablet by mouth 3 (Three) Times a Day As Needed for Muscle Spasms., Disp: 270 tablet, Rfl: 3    traZODone (DESYREL) 150 MG tablet, TAKE 1 TO 2 TABLETS BY MOUTH EVERY NIGHT AT BEDTIME, Disp: 180 tablet, Rfl: 3    Trelegy Ellipta 100-62.5-25 MCG/ACT inhaler, Inhale 1 puff Daily., Disp: 1 each, Rfl: 5    ondansetron ODT (ZOFRAN-ODT) 4 MG disintegrating tablet, Place 1 tablet on the tongue Every 8 (Eight) Hours As Needed for Nausea or Vomiting., Disp: 90 tablet, Rfl: 1    Allergies: Sulfa antibiotics and Adhesive tape    Physical Exam  Vitals reviewed.   Constitutional:       Appearance: Normal appearance.   Cardiovascular:      Rate and Rhythm: Normal rate and regular rhythm.      Heart sounds: Normal heart sounds.   Pulmonary:       Effort: Pulmonary effort is normal.      Breath sounds: Normal breath sounds.   Abdominal:      General: Bowel sounds are normal.      Palpations: Abdomen is soft.   Musculoskeletal:         General: Normal range of motion.   Neurological:      General: No focal deficit present.      Mental Status: She is alert and oriented to person, place, and time.   Psychiatric:         Mood and Affect: Mood normal.          Result Review :                   Assessment and Plan    Diagnoses and all orders for this visit:    1. Acquired hypothyroidism (Primary)  Assessment & Plan:  Continue present care no changes meds refilled. Routine screening labs ordered. Further recommendations will depend upon those results.     Orders:  -     TSH; Future  -     Hemoglobin A1c; Future  -     Lipid Panel; Future  -     TSH  -     Hemoglobin A1c  -     Lipid Panel    2. Peripheral edema  Assessment & Plan:  Continue present care no changes meds refilled.     Orders:  -     furosemide (LASIX) 40 MG tablet; Take 1 tablet by mouth Daily.  Dispense: 90 tablet; Refill: 3    3. Morbid obesity  Assessment & Plan:  Patient's (Body mass index is 51.39 kg/m².) indicates that they are morbidly/severely obese (BMI > 40 or > 35 with obesity - related health condition) with health conditions that include obstructive sleep apnea, hypertension, dyslipidemias, osteoarthritis, and lower extremity venous stasis disease . Weight is unchanged. BMI  is above average; BMI management plan is completed. We discussed low calorie, low carb based diet program, portion control, increasing exercise, and patient is not interested in weight management program at this time .       4. Gastroesophageal reflux disease, unspecified whether esophagitis present  Assessment & Plan:  Continue present care no changes meds refilled.      5. Hypokalemia  Assessment & Plan:  Routine screening labs ordered. Further recommendations will depend upon those results.       6. Stage 3a  chronic kidney disease  Assessment & Plan:  Renal condition is stable.  Continue current treatment regimen.  Routine screening labs ordered. Further recommendations will depend upon those results.   Renal condition will be reassessed in 6 months.    Orders:  -     CBC & Differential; Future  -     Comprehensive Metabolic Panel; Future  -     CBC & Differential  -     Comprehensive Metabolic Panel    Other orders  -     fluticasone (FLONASE) 50 MCG/ACT nasal spray; 2 sprays by Each Nare route Daily.  Dispense: 48 g; Refill: 5  -     nystatin (MYCOSTATIN) 592218 UNIT/GM powder; Apply  topically to the appropriate area as directed 3 (Three) Times a Day.  Dispense: 60 g; Refill: 5  -     Trelegy Ellipta 100-62.5-25 MCG/ACT inhaler; Inhale 1 puff Daily.  Dispense: 1 each; Refill: 5  -     albuterol (PROVENTIL) (2.5 MG/3ML) 0.083% nebulizer solution; Take 2.5 mg by nebulization Every 4 (Four) Hours As Needed for Wheezing.  Dispense: 60 each; Refill: 12  -     albuterol sulfate  (90 Base) MCG/ACT inhaler; Inhale 2 puffs Every 4 (Four) Hours As Needed for Wheezing.  Dispense: 18 g; Refill: 12  -     docusate sodium 100 MG capsule; Take 1 capsule by mouth Daily.  Dispense: 90 capsule; Refill: 3  -     FLUoxetine (PROzac) 40 MG capsule; Take 1 capsule by mouth Daily.  Dispense: 90 capsule; Refill: 3  -     hydrOXYzine (ATARAX) 25 MG tablet; Take 1 tablet by mouth 3 (Three) Times a Day.  Dispense: 90 tablet; Refill: 3  -     levothyroxine (SYNTHROID, LEVOTHROID) 75 MCG tablet; Take 1 tablet by mouth Daily.  Dispense: 90 tablet; Refill: 3  -     montelukast (SINGULAIR) 10 MG tablet; Take 1 tablet by mouth Every Night.  Dispense: 90 tablet; Refill: 3  -     oxybutynin (DITROPAN) 5 MG tablet; Take 1 tablet by mouth 2 (Two) Times a Day.  Dispense: 180 tablet; Refill: 3  -     pantoprazole (PROTONIX) 40 MG EC tablet; Take 1 tablet by mouth Daily.  Dispense: 90 tablet; Refill: 3  -     spironolactone (ALDACTONE) 25 MG tablet;  Take 1 tablet by mouth Daily.  Dispense: 90 tablet; Refill: 3  -     tiZANidine (ZANAFLEX) 4 MG tablet; Take 1 tablet by mouth 3 (Three) Times a Day As Needed for Muscle Spasms.  Dispense: 270 tablet; Refill: 3  -     traZODone (DESYREL) 150 MG tablet; TAKE 1 TO 2 TABLETS BY MOUTH EVERY NIGHT AT BEDTIME  Dispense: 180 tablet; Refill: 3  -     ondansetron ODT (ZOFRAN-ODT) 4 MG disintegrating tablet; Place 1 tablet on the tongue Every 8 (Eight) Hours As Needed for Nausea or Vomiting.  Dispense: 90 tablet; Refill: 1  -     Pneumococcal Conjugate Vaccine 20-Valent All        Follow Up   Return in about 6 months (around 2/6/2025) for Annual physical.  Patient was given instructions and counseling regarding her condition or for health maintenance advice. Please see specific information pulled into the AVS if appropriate.     Radha Valladares PA-C

## 2024-08-07 ENCOUNTER — TELEPHONE (OUTPATIENT)
Dept: FAMILY MEDICINE CLINIC | Facility: CLINIC | Age: 64
End: 2024-08-07
Payer: MEDICAID

## 2024-08-07 LAB
ALBUMIN SERPL-MCNC: 3.8 G/DL (ref 3.9–4.9)
ALP SERPL-CCNC: 109 IU/L (ref 44–121)
ALT SERPL-CCNC: 28 IU/L (ref 0–32)
AST SERPL-CCNC: 19 IU/L (ref 0–40)
BASOPHILS # BLD AUTO: 0.1 X10E3/UL (ref 0–0.2)
BASOPHILS NFR BLD AUTO: 2 %
BILIRUB SERPL-MCNC: 0.2 MG/DL (ref 0–1.2)
BUN SERPL-MCNC: 16 MG/DL (ref 8–27)
BUN/CREAT SERPL: 18 (ref 12–28)
CALCIUM SERPL-MCNC: 9 MG/DL (ref 8.7–10.3)
CHLORIDE SERPL-SCNC: 102 MMOL/L (ref 96–106)
CHOLEST SERPL-MCNC: 167 MG/DL (ref 100–199)
CO2 SERPL-SCNC: 27 MMOL/L (ref 20–29)
CREAT SERPL-MCNC: 0.91 MG/DL (ref 0.57–1)
EGFRCR SERPLBLD CKD-EPI 2021: 71 ML/MIN/1.73
EOSINOPHIL # BLD AUTO: 0.4 X10E3/UL (ref 0–0.4)
EOSINOPHIL NFR BLD AUTO: 5 %
ERYTHROCYTE [DISTWIDTH] IN BLOOD BY AUTOMATED COUNT: 15.4 % (ref 11.7–15.4)
GLOBULIN SER CALC-MCNC: 2.5 G/DL (ref 1.5–4.5)
GLUCOSE SERPL-MCNC: 121 MG/DL (ref 70–99)
HBA1C MFR BLD: 5.9 % (ref 4.8–5.6)
HCT VFR BLD AUTO: 45 % (ref 34–46.6)
HDLC SERPL-MCNC: 50 MG/DL
HGB BLD-MCNC: 14 G/DL (ref 11.1–15.9)
IMM GRANULOCYTES # BLD AUTO: 0 X10E3/UL (ref 0–0.1)
IMM GRANULOCYTES NFR BLD AUTO: 0 %
LDLC SERPL CALC-MCNC: 97 MG/DL (ref 0–99)
LYMPHOCYTES # BLD AUTO: 1.8 X10E3/UL (ref 0.7–3.1)
LYMPHOCYTES NFR BLD AUTO: 24 %
MCH RBC QN AUTO: 26.8 PG (ref 26.6–33)
MCHC RBC AUTO-ENTMCNC: 31.1 G/DL (ref 31.5–35.7)
MCV RBC AUTO: 86 FL (ref 79–97)
MONOCYTES # BLD AUTO: 0.7 X10E3/UL (ref 0.1–0.9)
MONOCYTES NFR BLD AUTO: 9 %
NEUTROPHILS # BLD AUTO: 4.7 X10E3/UL (ref 1.4–7)
NEUTROPHILS NFR BLD AUTO: 60 %
PLATELET # BLD AUTO: 344 X10E3/UL (ref 150–450)
POTASSIUM SERPL-SCNC: 4.2 MMOL/L (ref 3.5–5.2)
PROT SERPL-MCNC: 6.3 G/DL (ref 6–8.5)
RBC # BLD AUTO: 5.22 X10E6/UL (ref 3.77–5.28)
SODIUM SERPL-SCNC: 141 MMOL/L (ref 134–144)
TRIGL SERPL-MCNC: 111 MG/DL (ref 0–149)
TSH SERPL DL<=0.005 MIU/L-ACNC: 10 UIU/ML (ref 0.45–4.5)
VLDLC SERPL CALC-MCNC: 20 MG/DL (ref 5–40)
WBC # BLD AUTO: 7.7 X10E3/UL (ref 3.4–10.8)

## 2024-08-07 NOTE — TELEPHONE ENCOUNTER
PRIMARY CARE VISIT    Patient name : Dennys Enriquez   MRN number: [id number]   YOB: 1964       Reason for visit:   Chief Complaint   Patient presents with   • Annual Exam          Quality         History of Present Illness     57 yo m here for FU visit  Completed COVID vaccine  Following safety precautions      Hypertension   This is a chronic problem. The current episode started more than 1 year ago. The problem has been gradually improving since onset. The problem is controlled. Associated symptoms include chest pain. Pertinent negatives include no headaches, neck pain, palpitations or shortness of breath. Risk factors for coronary artery disease include dyslipidemia. The current treatment provides moderate improvement.     Low back pain and stiffness    Intermittent acid reflux SX       Review of Systems       Review of Systems   Constitutional: Negative for chills, fatigue and fever.   HENT: Negative for congestion, ear discharge, ear pain, postnasal drip, rhinorrhea, sinus pressure, sneezing, sore throat and tinnitus.    Eyes: Negative for discharge and redness.   Respiratory: Negative for cough, chest tightness, shortness of breath and wheezing.    Cardiovascular: Negative for chest pain, palpitations and leg swelling.   Gastrointestinal: Negative for abdominal pain, blood in stool, constipation, diarrhea, nausea and vomiting.   Endocrine: Negative for polydipsia.   Genitourinary: Negative for dysuria, flank pain, hematuria and urgency.   Musculoskeletal: Negative for arthralgias, back pain, joint swelling, myalgias, neck pain and neck stiffness.   Skin: Negative for rash.   Allergic/Immunologic: Negative for food allergies.   Neurological: Negative for dizziness, seizures, syncope, weakness, numbness and headaches.   Hematological: Negative for adenopathy.   Psychiatric/Behavioral: Negative for hallucinations. The patient is not nervous/anxious.         Allergies  ALLERGIES:   Allergen Reactions    Caller: RaghavAnastasiia    Relationship: Self    Best call back number: [phone]     What medication are you requesting: VENTOLIN HFA EMERGENCY INHALER    What are your current symptoms: ASTHMA/COPD    Have you had these symptoms before:    [x] Yes  [] No    Have you been treated for these symptoms before:   [x] Yes  [] No    If a prescription is needed, what is your preferred pharmacy and phone number: [facility] [id number] - [place], [place] - [address] AT [facility] [address] - [phone]  - [phone] FX     Additional notes:PT NEEDS INHALER.       • Lisinopril Angioedema       Current Meds  Current Outpatient Medications   Medication Sig Dispense Refill   • omeprazole (PriLOSEC) 40 MG capsule Take 1 capsule by mouth daily. 90 capsule 3   • sildenafil (VIAGRA) 100 MG tablet TAKE 1 TABLET BY MOUTH AS NEEDED FOR ERECTILE DYSFUNCTION 10 tablet 3   • cetirizine (ZyrTEC) 10 MG tablet Take 1 tablet by mouth daily. 30 tablet 3     No current facility-administered medications for this visit.           Past Medical History  Past Medical History:   Diagnosis Date   • Essential (primary) hypertension        Surgical History  Past Surgical History:   Procedure Laterality Date   • Colonoscopy     • No past surgeries         Family History  Family History   Problem Relation Age of Onset   • Diabetes Mother    • Patient is unaware of any medical problems Father    • Diabetes Brother        Social History  Social History     Tobacco Use   • Smoking status: Never Smoker   • Smokeless tobacco: Never Used   Vaping Use   • Vaping Use: never used   Substance Use Topics   • Alcohol use: Yes     Comment: occasionally   • Drug use: Never        Review  Dennys has a past medical history of Essential (primary) hypertension.    He has no past medical history of Failed moderate sedation during procedure, Malignant hyperthermia, Motion sickness, PONV (postoperative nausea and vomiting), or Spinal headache.  Dennys has Acid reflux; Hyperlipidemia; Essential hypertension; Testicular pain, right; Male erectile disorder; IFG (impaired fasting glucose); Numbness and tingling of foot; and Polyp of colon on their problem list.  Dennys has a past surgical history that includes No past surgeries and Colonoscopy.  His family history includes Diabetes in his brother and mother; Patient is unaware of any medical problems in his father.  Dennys reports that he has never smoked. He has never used smokeless tobacco. He reports current alcohol use. He reports that he does not use drugs.  Dennys has a  current medication list which includes the following prescription(s): omeprazole, sildenafil, and cetirizine.  Dennys   Current Outpatient Medications   Medication Sig Dispense Refill   • omeprazole (PriLOSEC) 40 MG capsule Take 1 capsule by mouth daily. 90 capsule 3   • sildenafil (VIAGRA) 100 MG tablet TAKE 1 TABLET BY MOUTH AS NEEDED FOR ERECTILE DYSFUNCTION 10 tablet 3   • cetirizine (ZyrTEC) 10 MG tablet Take 1 tablet by mouth daily. 30 tablet 3     No current facility-administered medications for this visit.        Vitals  Visit Vitals  BP (!) 148/80 (BP Location: LUE - Left upper extremity, Patient Position: Sitting, Cuff Size: Regular)   Pulse 69   Resp 16   Ht 6' 1\" (1.854 m)   Wt 118.2 kg (260 lb 9.3 oz)   SpO2 100%   BMI 34.38 kg/m²       Physical Exam  Constitutional:       Appearance: He is well-developed.   HENT:      Head: Normocephalic.   Eyes:      Conjunctiva/sclera: Conjunctivae normal.      Pupils: Pupils are equal, round, and reactive to light.   Neck:      Trachea: No tracheal deviation.   Cardiovascular:      Rate and Rhythm: Normal rate and regular rhythm.      Heart sounds: Normal heart sounds.   Pulmonary:      Effort: Pulmonary effort is normal.      Breath sounds: Normal breath sounds. No wheezing.   Abdominal:      General: Bowel sounds are normal.      Palpations: Abdomen is soft.      Tenderness: There is no abdominal tenderness.   Musculoskeletal:         General: Normal range of motion.      Cervical back: Normal range of motion and neck supple.   Skin:     General: Skin is warm and dry.   Neurological:      Mental Status: He is alert and oriented to person, place, and time.   Psychiatric:         Behavior: Behavior normal.          Results/Data  Lab Services on 08/01/2022   Component Date Value Ref Range Status   • Sodium 08/01/2022 140  135 - 145 mmol/L Final   • Potassium 08/01/2022 4.4  3.4 - 5.1 mmol/L Final   • Chloride 08/01/2022 108  97 - 110 mmol/L Final   • Carbon Dioxide  08/01/2022 25  21 - 32 mmol/L Final   • Anion Gap 08/01/2022 11  7 - 19 mmol/L Final   • Glucose 08/01/2022 124 (A) 70 - 99 mg/dL Final   • BUN 08/01/2022 16  6 - 20 mg/dL Final   • Creatinine 08/01/2022 1.32 (A) 0.67 - 1.17 mg/dL Final   • Glomerular Filtration Rate 08/01/2022 63  >=60 Final    eGFR results = or >60 mL/min/1.73m2 = Normal kidney function. Estimated GFR calculated using the CKD-EPI-R (2021) equation that does not include race in the creatinine calculation.   • BUN/ Creatinine Ratio 08/01/2022 12  7 - 25 Final   • Calcium 08/01/2022 9.6  8.4 - 10.2 mg/dL Final   • Bilirubin, Total 08/01/2022 0.6  0.2 - 1.0 mg/dL Final   • GOT/AST 08/01/2022 19  <=37 Units/L Final   • GPT/ALT 08/01/2022 23  <64 Units/L Final   • Alkaline Phosphatase 08/01/2022 65  45 - 117 Units/L Final   • Albumin 08/01/2022 3.6  3.6 - 5.1 g/dL Final   • Protein, Total 08/01/2022 6.6  6.4 - 8.2 g/dL Final   • Globulin 08/01/2022 3.0  2.0 - 4.0 g/dL Final   • A/G Ratio 08/01/2022 1.2  1.0 - 2.4 Final   • Cholesterol 08/01/2022 213 (A) <=199 mg/dL Final    Desirable         <200  Borderline High   200 to 239  High              >=240   • Triglycerides 08/01/2022 141  <=149 mg/dL Final    Normal            <150  Borderline High   150 to 199  High              200 to 499  Very High         >=500   • HDL 08/01/2022 48  >=40 mg/dL Final    Low              <40  Borderline Low   40 to 49  Near Optimal     50 to 59  Optimal          >=60   • LDL 08/01/2022 137 (A) <=129 mg/dL Final    OPTIMAL           <100  NEAR OPTIMAL      100 to 129  BORDERLINE HIGH   130 to 159  HIGH              160 to 189  VERY HIGH         >=190   • Non-HDL Cholesterol 08/01/2022 165  mg/dL Final    Therapeutic Target:  CHD and risk equivalents  <130  Multiple risk factors     <160  0 to 1 risk factor        <190   • Cholesterol/ HDL Ratio 08/01/2022 4.4  <=4.4 Final   • Hemoglobin A1C 08/01/2022 6.5 (A) 4.5 - 5.6 % Final      Diabetic Screening  Non Diabetic:              <5.7%  Increased Risk:           5.7-6.4%  Diagnostic For Diabetes:  >6.4%    Diabetic Control  A1C%       eAG mg/dL  6.0            126  6.5            140  7.0            154  7.5            169  8.0            183  8.5            197  9.0            212  9.5            226  10.0           240   • Microalbumin, Urine 08/01/2022 1.56  mg/dL Final   • Creatinine, Urine 08/01/2022 268.00  mg/dL Final   • Microalbumin/ Creatinine Ratio 08/01/2022 5.8  <30.0 mg/g Final   • Prostate Specific Antigen 08/01/2022 0.66  <=3.50 ng/mL Final    [employer] Chemiluminescence. Results obtained with different assay methods or kits cannot be used interchangeably.     • TSH 08/01/2022 1.382  0.350 - 5.000 mcUnits/mL Final   • COLOR, URINALYSIS 08/01/2022 Yellow   Final   • APPEARANCE, URINALYSIS 08/01/2022 Clear   Final   • GLUCOSE, URINALYSIS 08/01/2022 Negative  Negative mg/dL Final   • BILIRUBIN, URINALYSIS 08/01/2022 Negative  Negative Final   • KETONES, URINALYSIS 08/01/2022 Negative  Negative mg/dL Final   • SPECIFIC GRAVITY, URINALYSIS 08/01/2022 1.020  1.005 - 1.030 Final   • OCCULT BLOOD, URINALYSIS 08/01/2022 Negative  Negative Final   • PH, URINALYSIS 08/01/2022 6.0  5.0 - 7.0 Final   • PROTEIN, URINALYSIS 08/01/2022 Negative  Negative mg/dL Final   • UROBILINOGEN, URINALYSIS 08/01/2022 0.2  0.2, 1.0 mg/dL Final   • NITRITE, URINALYSIS 08/01/2022 Negative  Negative Final   • LEUKOCYTE ESTERASE, URINALYSIS 08/01/2022 Negative  Negative Final   • Vitamin D, 25-Hydroxy 08/01/2022 51.7  30.0 - 100.0 ng/mL Final    <20 ng/mL  =  Vitamin D deficiency  20-29 ng/mL  =  Vitamin D insufficiency   ng/mL  =  Optimal Vitamin D  >150 ng/mL  =  Possible toxicity   • WBC 08/01/2022 5.6  4.2 - 11.0 K/mcL Final   • RBC 08/01/2022 5.45  4.50 - 5.90 mil/mcL Final   • HGB 08/01/2022 15.2  13.0 - 17.0 g/dL Final   • HCT 08/01/2022 46.2  39.0 - 51.0 % Final   • MCV 08/01/2022 84.8  78.0 - 100.0 fl Final   • MCH 08/01/2022 27.9   26.0 - 34.0 pg Final   • MCHC 08/01/2022 32.9  32.0 - 36.5 g/dL Final   • RDW-CV 08/01/2022 13.0  11.0 - 15.0 % Final   • RDW-SD 08/01/2022 39.7  39.0 - 50.0 fL Final   • PLT 08/01/2022 178  140 - 450 K/mcL Final   • NRBC 08/01/2022 0  <=0 /100 WBC Final   • Neutrophil, Percent 08/01/2022 45  % Final   • Lymphocytes, Percent 08/01/2022 44  % Final   • Mono, Percent 08/01/2022 7  % Final   • Eosinophils, Percent 08/01/2022 3  % Final   • Basophils, Percent 08/01/2022 1  % Final   • Immature Granulocytes 08/01/2022 0  % Final   • Absolute Neutrophils 08/01/2022 2.5  1.8 - 7.7 K/mcL Final   • Absolute Lymphocytes 08/01/2022 2.5  1.0 - 4.0 K/mcL Final   • Absolute Monocytes 08/01/2022 0.4  0.3 - 0.9 K/mcL Final   • Absolute Eosinophils  08/01/2022 0.2  0.0 - 0.5 K/mcL Final   • Absolute Basophils 08/01/2022 0.1  0.0 - 0.3 K/mcL Final   • Absolute Immmature Granulocytes 08/01/2022 0.0  0.0 - 0.2 K/mcL Final       Assessment     Essential hypertension  - TESTOSTERONE, TOTAL, MALE; Future    Hyperlipidemia, unspecified hyperlipidemia type    Erectile dysfunction, unspecified erectile dysfunction type    Gastroesophageal reflux disease without esophagitis  - omeprazole (PriLOSEC) 40 MG capsule; Take 1 capsule by mouth daily.  Dispense: 90 capsule; Refill: 3    Need for shingles vaccine  - ZOSTER SHINGLES RECOMBINANT ADJUVANTED IM(SHINGRIX)    Low back pain with sciatica, sciatica laterality unspecified, unspecified back pain laterality, unspecified chronicity  - XR LUMBAR SPINE 2 OR 3 VIEWS; Future      Plan:    DL  Low fat diet    HTN-stable, CPM    ED  Serum testosterone    GERD-stable, CPM    Low back pain  Xray LS Spine        RETURN TO OFFICE  No follow-ups on file.        Lissa Collins MD

## 2024-08-08 NOTE — TELEPHONE ENCOUNTER
Pharmacy contacted, Pharmacy states Pt picked up the Albuterol Inhaler on 8/6/2024 and it had a zero co pay. I tried to call Pt to see why she is now wanting Ventolin, unable to reach Pt or leave Vm

## 2024-08-13 ENCOUNTER — TELEPHONE (OUTPATIENT)
Dept: FAMILY MEDICINE CLINIC | Facility: CLINIC | Age: 64
End: 2024-08-13
Payer: MEDICAID

## 2024-08-13 NOTE — TELEPHONE ENCOUNTER
Pa for Trelegy Inhaler has been submitted through [employer] waiting for dermination from [employer].

## 2024-08-14 ENCOUNTER — TELEPHONE (OUTPATIENT)
Dept: CASE MANAGEMENT | Facility: OTHER | Age: 64
End: 2024-08-14
Payer: MEDICAID

## 2024-09-03 ENCOUNTER — TELEPHONE (OUTPATIENT)
Dept: CASE MANAGEMENT | Facility: OTHER | Age: 64
End: 2024-09-03
Payer: MEDICAID

## 2024-09-03 RX ORDER — ONDANSETRON 4 MG/1
4 TABLET, ORALLY DISINTEGRATING ORAL EVERY 8 HOURS PRN
Qty: 90 TABLET | Refills: 1 | Status: SHIPPED | OUTPATIENT
Start: 2024-09-03

## 2024-09-03 NOTE — TELEPHONE ENCOUNTER
CCM 90 day chart review completed. No changes noted to patient's condition or treatment plan. Will close program.

## 2024-09-03 NOTE — TELEPHONE ENCOUNTER
Caller: Anastasiia Avilez    Relationship: Self    Best call back number: [phone]    Requested Prescriptions:   Requested Prescriptions     Pending Prescriptions Disp Refills    ondansetron ODT (ZOFRAN-ODT) 4 MG disintegrating tablet 90 tablet 1     Sig: Place 1 tablet on the tongue Every 8 (Eight) Hours As Needed for Nausea or Vomiting.        Pharmacy where request should be sent: [facility] [id number] [facility] [address] [facility] AT [facility] [address] - [phone] [facility] [phone] FX     Last office visit with prescribing clinician: 8/6/2024   Last telemedicine visit with prescribing clinician: Visit date not found   Next office visit with prescribing clinician: Visit date not found     Additional details provided by patient: PATIENT NEEDS REFILL     Does the patient have less than a 3 day supply:  [x] Yes  [] No    Would you like a call back once the refill request has been completed: [] Yes [x] No    If the office needs to give you a call back, can they leave a voicemail: [] Yes [x] No    Evelyn Hernandez   09/03/24 12:07 EDT

## 2024-10-18 ENCOUNTER — TELEPHONE (OUTPATIENT)
Dept: FAMILY MEDICINE CLINIC | Facility: CLINIC | Age: 64
End: 2024-10-18
Payer: MEDICAID

## 2024-10-18 NOTE — TELEPHONE ENCOUNTER
Caller: Anastasiia Avilez    Relationship: Self    Best call back number: [phone]     What is the best time to reach you: ASAP    Who are you requesting to speak with (clinical staff, provider,  specific staff member): VESTA HASTINGS       What was the call regarding: PATIENTS LUNG DOCTOR HAS RETIRED AND SHE HAS NOT FOUND A NEW ONE IN [place]. PATIENT WANTS TO KNOW IF DELFINA HASTINGS PA-C WOULD PRESCRIBE THE MEDICATION HER LUNG DOCTOR WAS PRESCRIBING. SHE HAS NOT BEEN ABLE TO GET IT AND NEEDS IT ASAP      SPIRIVA (CAN NOT REMEMBER THE DOSE)     [facility] [id number] - [place], [place] - [address] [facility] AT [facility] [place] [address] - [phone]  - [phone] FX

## 2024-10-21 NOTE — TELEPHONE ENCOUNTER
Name: Anastasiia Avilez      Relationship: Self      Best Callback Number: [phone]      HUB PROVIDED THE RELAY MESSAGE FROM THE OFFICE      PATIENT: VOICED UNDERSTANDING AND HAS NO FURTHER QUESTIONS AT THIS TIME    ADDITIONAL INFORMATION: PT WOULD LIKE THE SPIRIVA INSTEAD OF THE TRELIGY BECAUSE SHE HAS BEEN OFF OF THE TRELIGY FOR ABOUT 3 - 4 MONTHS. SHE RAN OUT OF REFILLS SHE SAID

## 2024-10-21 NOTE — TELEPHONE ENCOUNTER
Left Message: If Pt calls back ok for HUB to relay---  When I reviewed her chart it looks like she has taken trilogy and the trilogy has the same medication ended as the Spiriva so she cannot take both of those inhalers.  If she for some reason is not taking the Trelegy then we can send the Spiriva into her pharmacy but someone needs to let me know this.     Please let Lyly Means know what Pt says

## 2024-10-22 RX ORDER — TIOTROPIUM BROMIDE 18 UG/1
1 CAPSULE ORAL; RESPIRATORY (INHALATION)
Qty: 30 CAPSULE | Refills: 5 | Status: SHIPPED | OUTPATIENT
Start: 2024-10-22

## 2024-10-22 NOTE — TELEPHONE ENCOUNTER
Left detailed Message: If Pt calls back ok for Hub to relay--Lyly has sent the Spiriva into her pharmacy.

## 2024-10-22 NOTE — TELEPHONE ENCOUNTER
Lyly, Pt called back and stated  that she does not feel like the Trelegy is working well and is causing her to have a rash in her mouth. Pt is requesting we switch her to the Spriva, can you call this in for Pt?

## 2024-11-07 ENCOUNTER — TELEPHONE (OUTPATIENT)
Dept: FAMILY MEDICINE CLINIC | Facility: CLINIC | Age: 64
End: 2024-11-07
Payer: MEDICAID

## 2024-11-07 NOTE — TELEPHONE ENCOUNTER
Caller: Anastasiia Avilez    Relationship: Self    Best call back number:   Telephone Information:   Mobile [phone]        What is the best time to reach you: ANYTIME    Who are you requesting to speak with (clinical staff, provider,  specific staff member): CLINICAL    Do you know the name of the person who called:     What was the call regarding: PATIENT DROPPED ON [employer] PASS PAPER WORK ON EITHER 10/30/2024 OR 10/31/24 ( WAS NOT SURE) AND WAS CALLING TO FOLLOW UP TO SEE IF IT HAD BEEN FILLED OUT OR NOT. PLEASE CALL PATIENT BACK.     Is it okay if the provider responds through [employer]: NO PLEASE CALLBACK

## 2024-11-07 NOTE — TELEPHONE ENCOUNTER
Pt Contacted: returning Pts call, Pt advised PW has been completed and ready @ Front window. Pt verbalized understanding.

## 2024-11-18 RX ORDER — ONDANSETRON 4 MG/1
4 TABLET, ORALLY DISINTEGRATING ORAL EVERY 8 HOURS PRN
Qty: 90 TABLET | Refills: 1 | Status: SHIPPED | OUTPATIENT
Start: 2024-11-18

## 2024-11-18 NOTE — TELEPHONE ENCOUNTER
Caller: Anastasiia Avilez    Relationship: Self    Best call back number: [phone]     Requested Prescriptions:   Requested Prescriptions     Pending Prescriptions Disp Refills    ondansetron ODT (ZOFRAN-ODT) 4 MG disintegrating tablet 90 tablet 1     Sig: Place 1 tablet on the tongue Every 8 (Eight) Hours As Needed for Nausea or Vomiting.        Pharmacy where request should be sent: [facility] [id number] [facility] [address] [facility] AT [facility] [address] - [phone] [facility] [phone] FX     Last office visit with prescribing clinician: 8/6/2024   Last telemedicine visit with prescribing clinician: Visit date not found   Next office visit with prescribing clinician: Visit date not found     Additional details provided by patient: OUT OF MEDICATION     Does the patient have less than a 3 day supply:  [x] Yes  [] No    Would you like a call back once the refill request has been completed: [] Yes [x] No    If the office needs to give you a call back, can they leave a voicemail: [] Yes [x] No    Evelyn Valenzuela Rep   11/18/24 11:03 EST

## 2024-11-19 ENCOUNTER — OFFICE VISIT (OUTPATIENT)
Dept: FAMILY MEDICINE CLINIC | Facility: CLINIC | Age: 64
End: 2024-11-19
Payer: MEDICAID

## 2024-11-19 VITALS
HEART RATE: 70 BPM | RESPIRATION RATE: 14 BRPM | OXYGEN SATURATION: 92 % | HEIGHT: 69 IN | BODY MASS INDEX: 43.4 KG/M2 | SYSTOLIC BLOOD PRESSURE: 130 MMHG | DIASTOLIC BLOOD PRESSURE: 74 MMHG | WEIGHT: 293 LBS

## 2024-11-19 DIAGNOSIS — J41.8 MIXED SIMPLE AND MUCOPURULENT CHRONIC BRONCHITIS: ICD-10-CM

## 2024-11-19 DIAGNOSIS — K74.60 HEPATIC CIRRHOSIS, UNSPECIFIED HEPATIC CIRRHOSIS TYPE, UNSPECIFIED WHETHER ASCITES PRESENT: ICD-10-CM

## 2024-11-19 DIAGNOSIS — J18.9 PNEUMONIA DUE TO INFECTIOUS ORGANISM, UNSPECIFIED LATERALITY, UNSPECIFIED PART OF LUNG: Primary | ICD-10-CM

## 2024-11-19 NOTE — PROGRESS NOTES
Office Note     Name: Anastasiia Avilez    : 1960     MRN: [id number]     Chief Complaint  Pneumonia (Recently in hospital at [facility]/Wants to discuss labs)    Subjective   Patient or patient representative verbalized consent for the use of Ambient Listening during the visit with  Alix Amezquita PA-C for chart documentation. 2024  10:52 EST      Anastasiia Avilez is a 64 y.o. female.     The patient presents for evaluation of multiple medical concerns. She is accompanied by an adult male.    She has been under the care of a gastroenterologist for her stomach issues. A comprehensive blood test revealed an elevated white blood cell count, attributed to a recent bout of pneumonia. She was hospitalized for 3 to 4 weeks due to severe leg swelling and pneumonia. During her hospital stay, she was treated with antibiotics and steroids. She is scheduled for a liver ultrasound on 2024.     She has been diagnosed with Chronic Obstructive Pulmonary Disease (COPD). Her breathing has improved since her hospital stay. She uses Ventolin as a rescue inhaler and takes Spiriva once daily. She also has a nebulizer at home.    Review of Systems:   Review of Systems   All other systems reviewed and are negative.      Past Medical History:   Past Medical History:   Diagnosis Date    Multilevel degenerative disc disease        Past Surgical History: History reviewed. No pertinent surgical history.    Family History: History reviewed. No pertinent family history.    Social History:   Social History     Socioeconomic History    Marital status: Single   Tobacco Use    Smoking status: Every Day     Current packs/day: 1.00     Average packs/day: 1 pack/day for 40.0 years (40.0 ttl pk-yrs)     Types: Cigarettes    Smokeless tobacco: Never   Vaping Use    Vaping status: Never Used   Substance and Sexual Activity    Alcohol use: Not Currently    Drug use: Never    Sexual activity: Defer       Immunizations:   Immunization  History   Administered Date(s) Administered    COVID-19 (PFIZER) Purple Cap Monovalent 04/27/2021, 06/16/2021    Fluzone  >6mos 11/19/2024    Pneumococcal Conjugate 20-Valent (PCV20) 08/06/2024        Medications:     Current Outpatient Medications:     albuterol (PROVENTIL) (2.5 MG/3ML) 0.083% nebulizer solution, Take 2.5 mg by nebulization Every 4 (Four) Hours As Needed for Wheezing., Disp: 60 each, Rfl: 12    albuterol sulfate  (90 Base) MCG/ACT inhaler, Inhale 2 puffs Every 4 (Four) Hours As Needed for Wheezing., Disp: 18 g, Rfl: 12    docusate sodium 100 MG capsule, Take 1 capsule by mouth Daily., Disp: 90 capsule, Rfl: 3    FLUoxetine (PROzac) 40 MG capsule, Take 1 capsule by mouth Daily., Disp: 90 capsule, Rfl: 3    fluticasone (FLONASE) 50 MCG/ACT nasal spray, 2 sprays by Each Nare route Daily., Disp: 48 g, Rfl: 5    furosemide (LASIX) 40 MG tablet, Take 1 tablet by mouth Daily., Disp: 90 tablet, Rfl: 3    gabapentin (NEURONTIN) 800 MG tablet, Take 1 tablet by mouth 3 times a day., Disp: , Rfl:     HYDROcodone-acetaminophen (NORCO) 7.5-325 MG per tablet, Take 1 tablet by mouth 3 times a day., Disp: , Rfl:     hydrOXYzine (ATARAX) 25 MG tablet, Take 1 tablet by mouth 3 (Three) Times a Day., Disp: 90 tablet, Rfl: 3    levothyroxine (SYNTHROID, LEVOTHROID) 75 MCG tablet, Take 1 tablet by mouth Daily., Disp: 90 tablet, Rfl: 3    meloxicam (MOBIC) 7.5 MG tablet, , Disp: , Rfl:     montelukast (SINGULAIR) 10 MG tablet, Take 1 tablet by mouth Every Night., Disp: 90 tablet, Rfl: 3    nystatin (MYCOSTATIN) 683336 UNIT/GM powder, Apply  topically to the appropriate area as directed 3 (Three) Times a Day., Disp: 60 g, Rfl: 5    ondansetron ODT (ZOFRAN-ODT) 4 MG disintegrating tablet, Place 1 tablet on the tongue Every 8 (Eight) Hours As Needed for Nausea or Vomiting., Disp: 90 tablet, Rfl: 1    oxybutynin (DITROPAN) 5 MG tablet, Take 1 tablet by mouth 2 (Two) Times a Day., Disp: 180 tablet, Rfl: 3     "pantoprazole (PROTONIX) 40 MG EC tablet, Take 1 tablet by mouth Daily., Disp: 90 tablet, Rfl: 3    spironolactone (ALDACTONE) 25 MG tablet, Take 1 tablet by mouth Daily., Disp: 90 tablet, Rfl: 3    tiotropium (Spiriva HandiHaler) 18 MCG per inhalation capsule, Place 1 capsule into inhaler and inhale Daily., Disp: 30 capsule, Rfl: 5    tiZANidine (ZANAFLEX) 4 MG tablet, Take 1 tablet by mouth 3 (Three) Times a Day As Needed for Muscle Spasms., Disp: 270 tablet, Rfl: 3    traZODone (DESYREL) 150 MG tablet, TAKE 1 TO 2 TABLETS BY MOUTH EVERY NIGHT AT BEDTIME, Disp: 180 tablet, Rfl: 3    Allergies:   Allergies   Allergen Reactions    Sulfa Antibiotics Rash, Itching, GI Intolerance and Hives    Adhesive Tape Rash       Objective     Vital Signs  /74 (BP Location: Left arm, Patient Position: Sitting, Cuff Size: Large Adult)   Pulse 70   Resp 14   Ht 175.3 cm (69\")   Wt (!) 158 kg (348 lb)   SpO2 92%   BMI 51.39 kg/m²   Estimated body mass index is 51.39 kg/m² as calculated from the following:    Height as of this encounter: 175.3 cm (69\").    Weight as of this encounter: 158 kg (348 lb).          Physical Exam  Vitals reviewed.   Constitutional:       Appearance: Normal appearance.   Cardiovascular:      Rate and Rhythm: Normal rate and regular rhythm.      Heart sounds: Normal heart sounds.   Pulmonary:      Effort: Pulmonary effort is normal.      Breath sounds: Normal breath sounds.   Abdominal:      General: Bowel sounds are normal.      Palpations: Abdomen is soft.   Musculoskeletal:         General: Normal range of motion.   Neurological:      General: No focal deficit present.      Mental Status: She is alert and oriented to person, place, and time.   Psychiatric:         Mood and Affect: Mood normal.          Results:  No results found for this or any previous visit (from the past 24 hours).   Assessment and Plan     Diagnoses and all orders for this visit:    1. Pneumonia due to infectious organism, " unspecified laterality, unspecified part of lung (Primary)  Assessment & Plan:  Unfortunately, we were unable to obtain hospital records while the patient was in the office. Per her hx, she was hospitalized for pneumonia and was treated with antibiotics. She was sent home with a 3-day course of steroids. She reports improvement in her breathing. The patient had elevated white blood cell count, likely due to recent pneumonia and steroid, but she states that they advised her to f/u for a repeat level with her PCP this week.. Blood work has been ordered to monitor her health status.    Orders:  -     CBC & Differential    2. Mixed simple and mucopurulent chronic bronchitis  Assessment & Plan:  She uses Ventolin as a rescue inhaler, Spiriva once a day, and a nebulizer for breathing treatments. She has sufficient medication for her nebulizer. I advised f/u with pulmonology as directed.       3. Hepatic cirrhosis, unspecified hepatic cirrhosis type, unspecified whether ascites present  Overview:  Formatting of this note might be different from the original.  Does not see gastroenterologist.    Assessment & Plan:  She is scheduled for an ultrasound of her liver on the 22nd of this month. Advised to keep appointment and f/u with GI as directed.      Other orders  -     Fluzone >6mos ([id number])        Follow Up  Return for f/u with PCP in 2-3 weeks.    Alix Amezquita PA-C  [facility]

## 2024-11-25 ENCOUNTER — LAB (OUTPATIENT)
Dept: FAMILY MEDICINE CLINIC | Facility: CLINIC | Age: 64
End: 2024-11-25
Payer: MEDICAID

## 2024-11-25 DIAGNOSIS — J18.9 PNEUMONIA DUE TO INFECTIOUS ORGANISM, UNSPECIFIED LATERALITY, UNSPECIFIED PART OF LUNG: ICD-10-CM

## 2024-11-26 LAB
BASOPHILS # BLD AUTO: 0.1 X10E3/UL (ref 0–0.2)
BASOPHILS NFR BLD AUTO: 1 %
EOSINOPHIL # BLD AUTO: 0.1 X10E3/UL (ref 0–0.4)
EOSINOPHIL NFR BLD AUTO: 1 %
ERYTHROCYTE [DISTWIDTH] IN BLOOD BY AUTOMATED COUNT: 17.1 % (ref 11.7–15.4)
HCT VFR BLD AUTO: 47.1 % (ref 34–46.6)
HGB BLD-MCNC: 14 G/DL (ref 11.1–15.9)
IMM GRANULOCYTES # BLD AUTO: 0.1 X10E3/UL (ref 0–0.1)
IMM GRANULOCYTES NFR BLD AUTO: 1 %
LYMPHOCYTES # BLD AUTO: 1.7 X10E3/UL (ref 0.7–3.1)
LYMPHOCYTES NFR BLD AUTO: 14 %
MCH RBC QN AUTO: 26.7 PG (ref 26.6–33)
MCHC RBC AUTO-ENTMCNC: 29.7 G/DL (ref 31.5–35.7)
MCV RBC AUTO: 90 FL (ref 79–97)
MONOCYTES # BLD AUTO: 0.8 X10E3/UL (ref 0.1–0.9)
MONOCYTES NFR BLD AUTO: 6 %
NEUTROPHILS # BLD AUTO: 9.8 X10E3/UL (ref 1.4–7)
NEUTROPHILS NFR BLD AUTO: 77 %
PLATELET # BLD AUTO: 336 X10E3/UL (ref 150–450)
RBC # BLD AUTO: 5.24 X10E6/UL (ref 3.77–5.28)
WBC # BLD AUTO: 12.6 X10E3/UL (ref 3.4–10.8)

## 2024-11-27 ENCOUNTER — TELEPHONE (OUTPATIENT)
Dept: FAMILY MEDICINE CLINIC | Facility: CLINIC | Age: 64
End: 2024-11-27

## 2024-11-27 NOTE — TELEPHONE ENCOUNTER
Caller: Anastasiia Avilez    Relationship: Self    Best call back number: [phone]    Caller requesting test results: YES     What test was performed: BLOOD WORK     When was the test performed: 11/25/2024    Where was the test performed: IN OFFICE     Additional notes: PATIENT IS WANTING A CALL TO DISCUSS BLOOD WORK RESULTS. ALSO STATES SHE NEEDS A COPY FAXED -[phone] [facility] OFFICE.

## 2024-12-01 NOTE — ASSESSMENT & PLAN NOTE
She is scheduled for an ultrasound of her liver on the 22nd of this month. Advised to keep appointment and f/u with GI as directed.

## 2024-12-01 NOTE — ASSESSMENT & PLAN NOTE
Unfortunately, we were unable to obtain hospital records while the patient was in the office. Per her hx, she was hospitalized for pneumonia and was treated with antibiotics. She was sent home with a 3-day course of steroids. She reports improvement in her breathing. The patient had elevated white blood cell count, likely due to recent pneumonia and steroid, but she states that they advised her to f/u for a repeat level with her PCP this week.. Blood work has been ordered to monitor her health status.

## 2024-12-01 NOTE — ASSESSMENT & PLAN NOTE
She uses Ventolin as a rescue inhaler, Spiriva once a day, and a nebulizer for breathing treatments. She has sufficient medication for her nebulizer. I advised f/u with pulmonology as directed.

## 2024-12-11 ENCOUNTER — OFFICE VISIT (OUTPATIENT)
Dept: FAMILY MEDICINE CLINIC | Facility: CLINIC | Age: 64
End: 2024-12-11
Payer: MEDICAID

## 2024-12-11 VITALS
HEART RATE: 84 BPM | OXYGEN SATURATION: 90 % | BODY MASS INDEX: 43.4 KG/M2 | WEIGHT: 293 LBS | DIASTOLIC BLOOD PRESSURE: 84 MMHG | SYSTOLIC BLOOD PRESSURE: 118 MMHG | HEIGHT: 69 IN

## 2024-12-11 DIAGNOSIS — N18.31 STAGE 3A CHRONIC KIDNEY DISEASE: ICD-10-CM

## 2024-12-11 DIAGNOSIS — J41.8 MIXED SIMPLE AND MUCOPURULENT CHRONIC BRONCHITIS: ICD-10-CM

## 2024-12-11 DIAGNOSIS — R09.89 SUSPECTED CHF (CONGESTIVE HEART FAILURE): Primary | ICD-10-CM

## 2024-12-11 DIAGNOSIS — E03.9 ACQUIRED HYPOTHYROIDISM: ICD-10-CM

## 2024-12-11 PROBLEM — I50.9 ACUTE ON CHRONIC CONGESTIVE HEART FAILURE: Status: ACTIVE | Noted: 2022-06-21

## 2024-12-11 PROCEDURE — 1160F RVW MEDS BY RX/DR IN RCRD: CPT | Performed by: PHYSICIAN ASSISTANT

## 2024-12-11 PROCEDURE — 1159F MED LIST DOCD IN RCRD: CPT | Performed by: PHYSICIAN ASSISTANT

## 2024-12-11 PROCEDURE — 96372 THER/PROPH/DIAG INJ SC/IM: CPT | Performed by: PHYSICIAN ASSISTANT

## 2024-12-11 PROCEDURE — 1125F AMNT PAIN NOTED PAIN PRSNT: CPT | Performed by: PHYSICIAN ASSISTANT

## 2024-12-11 PROCEDURE — 99214 OFFICE O/P EST MOD 30 MIN: CPT | Performed by: PHYSICIAN ASSISTANT

## 2024-12-11 RX ORDER — GABAPENTIN 800 MG/1
800 TABLET ORAL 3 TIMES DAILY
COMMUNITY
End: 2024-12-11 | Stop reason: SDUPTHER

## 2024-12-11 RX ORDER — BUMETANIDE 1 MG/1
1 TABLET ORAL 2 TIMES DAILY
Qty: 60 TABLET | Refills: 5 | Status: SHIPPED | OUTPATIENT
Start: 2024-12-11

## 2024-12-11 RX ORDER — TRIAMCINOLONE ACETONIDE 40 MG/ML
80 INJECTION, SUSPENSION INTRA-ARTICULAR; INTRAMUSCULAR ONCE
Status: COMPLETED | OUTPATIENT
Start: 2024-12-11 | End: 2024-12-11

## 2024-12-11 RX ORDER — DEXTROMETHORPHAN HYDROBROMIDE AND PROMETHAZINE HYDROCHLORIDE 15; 6.25 MG/5ML; MG/5ML
5 SYRUP ORAL 4 TIMES DAILY PRN
Qty: 240 ML | Refills: 0 | Status: SHIPPED | OUTPATIENT
Start: 2024-12-11

## 2024-12-11 RX ORDER — PREDNISONE 20 MG/1
TABLET ORAL
Qty: 20 TABLET | Refills: 0 | Status: SHIPPED | OUTPATIENT
Start: 2024-12-11 | End: 2024-12-21

## 2024-12-11 RX ORDER — BUMETANIDE 1 MG/1
1 TABLET ORAL 2 TIMES DAILY
COMMUNITY
End: 2024-12-11 | Stop reason: SDUPTHER

## 2024-12-11 RX ADMIN — TRIAMCINOLONE ACETONIDE 80 MG: 40 INJECTION, SUSPENSION INTRA-ARTICULAR; INTRAMUSCULAR at 13:09

## 2024-12-11 NOTE — ASSESSMENT & PLAN NOTE
She still seems to be struggling somewhat with the wheezing and I am going to give her a steroid shot today and follow that up with a prednisone taper but I also told her that if she continued to have difficulty breathing especially if she got to the home where she is staying on her oxygen and was struggling she would need to go back to the hospital.  Patient did express understanding and agreed.

## 2024-12-11 NOTE — ASSESSMENT & PLAN NOTE
Patient recently was admitted to the hospital and medications were changed from Lasix to Bumex for better fluid offloading.  She states that the swelling is improved on the Bumex and we will continue that and stop her Lasix.  She continues to be short of breath which is multifactorial from not only her CHF but her underlying COPD.  She did not see cardiology as previously arranged so I am going to refer her again to cardiology.

## 2024-12-11 NOTE — ASSESSMENT & PLAN NOTE
The hospital records indicate that she is taken 125 mcg of thyroid medication however according to our chart she is only taking 75 mcg and the patient cannot tell me which she is actually taking so we will investigate that with her pharmacy and check her thyroid level today and make appropriate adjustments.

## 2024-12-11 NOTE — PROGRESS NOTES
"Chief Complaint  Hospital Follow Up Visit (FROM [facility] for swelling )    Subjective          Anastasiia Avilez presents to [facility]    History of Present Illness patient is here for a follow-up.  She was recently hospitalized for swelling thought to be related to congestive heart failure and exacerbation of COPD.  She was previously seen for this for hospital follow-up by another provider in the office but the hospital records were not available at that time and she was instructed to return this week.  She is staying with friends right now so she is able to use her oxygen when she is in the home and they are looking into the possibility of an apartment.  She and her  currently continue to be homeless on a frequent basis.  She continues to smoke despite being instructed to quit on numerous occasions and most recently in the hospital but she says she is \"cutting back.\"    Objective   Vital Signs:   /84 (BP Location: Right arm, Patient Position: Sitting, Cuff Size: Large Adult)   Pulse 84   Ht 175.3 cm (69\")   Wt (!) 160 kg (351 lb 12.8 oz)   SpO2 90%   BMI 51.95 kg/m²     Body mass index is 51.95 kg/m².    Review of Systems   Constitutional:  Negative for chills, fatigue and fever.   HENT:  Positive for congestion.    Respiratory:  Positive for shortness of breath and wheezing. Negative for cough.    Cardiovascular:  Positive for leg swelling. Negative for chest pain and palpitations.   Musculoskeletal:  Negative for back pain and myalgias.   Neurological:  Negative for dizziness and headache.   Psychiatric/Behavioral:  Negative for depressed mood. The patient is not nervous/anxious.        Past History:  Medical History: has a past medical history of Multilevel degenerative disc disease.   Surgical History: has no past surgical history on file.   Family History: family history is not on file.   Social History: reports that she has been smoking cigarettes. She has a 40 " pack-year smoking history. She has never used smokeless tobacco. She reports that she does not currently use alcohol. She reports that she does not use drugs.      Current Outpatient Medications:     albuterol (PROVENTIL) (2.5 MG/3ML) 0.083% nebulizer solution, Take 2.5 mg by nebulization Every 4 (Four) Hours As Needed for Wheezing., Disp: 60 each, Rfl: 12    albuterol sulfate  (90 Base) MCG/ACT inhaler, Inhale 2 puffs Every 4 (Four) Hours As Needed for Wheezing., Disp: 18 g, Rfl: 12    bumetanide (BUMEX) 1 MG tablet, Take 1 tablet by mouth 2 (Two) Times a Day., Disp: 60 tablet, Rfl: 5    docusate sodium 100 MG capsule, Take 1 capsule by mouth Daily., Disp: 90 capsule, Rfl: 3    FLUoxetine (PROzac) 40 MG capsule, Take 1 capsule by mouth Daily., Disp: 90 capsule, Rfl: 3    fluticasone (FLONASE) 50 MCG/ACT nasal spray, 2 sprays by Each Nare route Daily., Disp: 48 g, Rfl: 5    gabapentin (NEURONTIN) 800 MG tablet, Take 1 tablet by mouth 3 times a day., Disp: , Rfl:     HYDROcodone-acetaminophen (NORCO) 7.5-325 MG per tablet, Take 1 tablet by mouth 3 times a day., Disp: , Rfl:     hydrOXYzine (ATARAX) 25 MG tablet, Take 1 tablet by mouth 3 (Three) Times a Day., Disp: 90 tablet, Rfl: 3    levothyroxine (SYNTHROID, LEVOTHROID) 75 MCG tablet, Take 1 tablet by mouth Daily., Disp: 90 tablet, Rfl: 3    meloxicam (MOBIC) 7.5 MG tablet, , Disp: , Rfl:     montelukast (SINGULAIR) 10 MG tablet, Take 1 tablet by mouth Every Night., Disp: 90 tablet, Rfl: 3    nystatin (MYCOSTATIN) 275570 UNIT/GM powder, Apply  topically to the appropriate area as directed 3 (Three) Times a Day., Disp: 60 g, Rfl: 5    ondansetron ODT (ZOFRAN-ODT) 4 MG disintegrating tablet, Place 1 tablet on the tongue Every 8 (Eight) Hours As Needed for Nausea or Vomiting., Disp: 90 tablet, Rfl: 1    oxybutynin (DITROPAN) 5 MG tablet, Take 1 tablet by mouth 2 (Two) Times a Day., Disp: 180 tablet, Rfl: 3    pantoprazole (PROTONIX) 40 MG EC tablet, Take 1  tablet by mouth Daily., Disp: 90 tablet, Rfl: 3    spironolactone (ALDACTONE) 25 MG tablet, Take 1 tablet by mouth Daily., Disp: 90 tablet, Rfl: 3    tiotropium (Spiriva HandiHaler) 18 MCG per inhalation capsule, Place 1 capsule into inhaler and inhale Daily., Disp: 30 capsule, Rfl: 5    tiZANidine (ZANAFLEX) 4 MG tablet, Take 1 tablet by mouth 3 (Three) Times a Day As Needed for Muscle Spasms., Disp: 270 tablet, Rfl: 3    traZODone (DESYREL) 150 MG tablet, TAKE 1 TO 2 TABLETS BY MOUTH EVERY NIGHT AT BEDTIME, Disp: 180 tablet, Rfl: 3    empagliflozin (Jardiance) 25 MG tablet tablet, Take 1 tablet by mouth Daily., Disp: 30 tablet, Rfl: 5    predniSONE (DELTASONE) 20 MG tablet, Take 3 tablets by mouth Daily for 3 days, THEN 2 tablets Daily for 3 days, THEN 1 tablet Daily for 5 days., Disp: 20 tablet, Rfl: 0    promethazine-dextromethorphan (PROMETHAZINE-DM) 6.25-15 MG/5ML syrup, Take 5 mL by mouth 4 (Four) Times a Day As Needed for Cough., Disp: 240 mL, Rfl: 0  No current facility-administered medications for this visit.    Allergies: Sulfa antibiotics and Adhesive tape    Physical Exam  Constitutional:       Appearance: She is obese.   Cardiovascular:      Rate and Rhythm: Normal rate and regular rhythm.      Heart sounds: Normal heart sounds. Heart sounds are distant.   Pulmonary:      Effort: Pulmonary effort is normal.      Breath sounds: Decreased breath sounds, wheezing and rhonchi present. No rales.   Musculoskeletal:      Right lower le+ Pitting Edema present.      Left lower le+ Pitting Edema present.   Neurological:      Mental Status: She is alert and oriented to person, place, and time.   Psychiatric:         Behavior: Behavior normal.          Result Review :                   Assessment and Plan    Diagnoses and all orders for this visit:    1. Suspected CHF (congestive heart failure) (Primary)  Assessment & Plan:  Patient recently was admitted to the hospital and medications were changed from  Lasix to Bumex for better fluid offloading.  She states that the swelling is improved on the Bumex and we will continue that and stop her Lasix.  She continues to be short of breath which is multifactorial from not only her CHF but her underlying COPD.  She did not see cardiology as previously arranged so I am going to refer her again to cardiology.          Orders:  -     CBC & Differential; Future  -     Basic Metabolic Panel; Future  -     BNP ([facility] Only); Future  -     Ambulatory Referral to Cardiology  -     BNP ([facility] Only)  -     Basic Metabolic Panel    2. Mixed simple and mucopurulent chronic bronchitis  Assessment & Plan:  She still seems to be struggling somewhat with the wheezing and I am going to give her a steroid shot today and follow that up with a prednisone taper but I also told her that if she continued to have difficulty breathing especially if she got to the home where she is staying on her oxygen and was struggling she would need to go back to the hospital.  Patient did express understanding and agreed.    Orders:  -     triamcinolone acetonide (KENALOG-40) injection 80 mg    3. Acquired hypothyroidism  Assessment & Plan:  The hospital records indicate that she is taken 125 mcg of thyroid medication however according to our chart she is only taking 75 mcg and the patient cannot tell me which she is actually taking so we will investigate that with her pharmacy and check her thyroid level today and make appropriate adjustments.    Orders:  -     TSH; Future  -     TSH    4. Stage 3a chronic kidney disease  Assessment & Plan:  Renal condition is stable.  We did add Jardiance to her medications today.  Continue current treatment regimen.  Routine screening labs ordered. Further recommendations will depend upon those results.      Other orders  -     bumetanide (BUMEX) 1 MG tablet; Take 1 tablet by mouth 2 (Two) Times a Day.  Dispense: 60 tablet; Refill: 5  -     empagliflozin (Jardiance) 25 MG  tablet tablet; Take 1 tablet by mouth Daily.  Dispense: 30 tablet; Refill: 5  -     promethazine-dextromethorphan (PROMETHAZINE-DM) 6.25-15 MG/5ML syrup; Take 5 mL by mouth 4 (Four) Times a Day As Needed for Cough.  Dispense: 240 mL; Refill: 0  -     predniSONE (DELTASONE) 20 MG tablet; Take 3 tablets by mouth Daily for 3 days, THEN 2 tablets Daily for 3 days, THEN 1 tablet Daily for 5 days.  Dispense: 20 tablet; Refill: 0        Follow Up   Return in about 4 weeks (around 1/8/2025) for Recheck.  Patient was given instructions and counseling regarding her condition or for health maintenance advice. Please see specific information pulled into the AVS if appropriate.     Radha Valladares PA-C

## 2024-12-11 NOTE — ASSESSMENT & PLAN NOTE
Renal condition is stable.  We did add Jardiance to her medications today.  Continue current treatment regimen.  Routine screening labs ordered. Further recommendations will depend upon those results.

## 2024-12-12 ENCOUNTER — TELEPHONE (OUTPATIENT)
Dept: CARDIOLOGY | Facility: CLINIC | Age: 64
End: 2024-12-12
Payer: MEDICAID

## 2024-12-12 LAB
BNP SERPL-MCNC: NORMAL PG/ML
BUN SERPL-MCNC: 17 MG/DL (ref 8–27)
BUN/CREAT SERPL: 15 (ref 12–28)
CALCIUM SERPL-MCNC: 9.4 MG/DL (ref 8.7–10.3)
CHLORIDE SERPL-SCNC: 106 MMOL/L (ref 96–106)
CO2 SERPL-SCNC: 16 MMOL/L (ref 20–29)
CREAT SERPL-MCNC: 1.15 MG/DL (ref 0.57–1)
EGFRCR SERPLBLD CKD-EPI 2021: 54 ML/MIN/1.73
GLUCOSE SERPL-MCNC: 84 MG/DL (ref 70–99)
POTASSIUM SERPL-SCNC: ABNORMAL MMOL/L
SODIUM SERPL-SCNC: 143 MMOL/L (ref 134–144)
SPECIMEN STATUS: NORMAL
TSH SERPL DL<=0.005 MIU/L-ACNC: 7.61 UIU/ML (ref 0.45–4.5)

## 2024-12-24 ENCOUNTER — OFFICE VISIT (OUTPATIENT)
Dept: CARDIOLOGY | Facility: CLINIC | Age: 64
End: 2024-12-24
Payer: MEDICAID

## 2024-12-24 VITALS
BODY MASS INDEX: 43.4 KG/M2 | RESPIRATION RATE: 18 BRPM | TEMPERATURE: 97 F | SYSTOLIC BLOOD PRESSURE: 138 MMHG | HEIGHT: 69 IN | HEART RATE: 83 BPM | DIASTOLIC BLOOD PRESSURE: 74 MMHG | OXYGEN SATURATION: 96 % | WEIGHT: 293 LBS

## 2024-12-24 DIAGNOSIS — E66.01 MORBID OBESITY WITH BMI OF 45.0-49.9, ADULT: ICD-10-CM

## 2024-12-24 DIAGNOSIS — R60.0 EDEMA LEG: Primary | ICD-10-CM

## 2024-12-24 DIAGNOSIS — I50.30 DIASTOLIC CONGESTIVE HEART FAILURE, UNSPECIFIED HF CHRONICITY: ICD-10-CM

## 2024-12-24 DIAGNOSIS — R06.02 SHORTNESS OF BREATH: ICD-10-CM

## 2024-12-24 PROCEDURE — 1160F RVW MEDS BY RX/DR IN RCRD: CPT | Performed by: INTERNAL MEDICINE

## 2024-12-24 PROCEDURE — 99214 OFFICE O/P EST MOD 30 MIN: CPT | Performed by: INTERNAL MEDICINE

## 2024-12-24 PROCEDURE — 93000 ELECTROCARDIOGRAM COMPLETE: CPT | Performed by: INTERNAL MEDICINE

## 2024-12-24 PROCEDURE — 1159F MED LIST DOCD IN RCRD: CPT | Performed by: INTERNAL MEDICINE

## 2024-12-24 RX ORDER — SPIRONOLACTONE 50 MG/1
50 TABLET, FILM COATED ORAL DAILY
Qty: 90 TABLET | Refills: 3 | Status: SHIPPED | OUTPATIENT
Start: 2024-12-24

## 2024-12-24 NOTE — PROGRESS NOTES
[facility]  [facility]  [address] [place] [address]  Dept: [phone]  Dept Fax: [phone]    Anastasiia Avilez  1960    Follow Up Office Visit Note    History of Present Illness:  Anastasiia Avilez is a 64 y.o. female who presents to the clinic for Follow-up. Edema and SOB, she was here once in 2023 for same thing edema, SOB, she was found out to have diastolic dysfunction, echo was limited,  at the hospital has mild calcifications of the coronary arteries, by ct chest, she is overweight , has not lost weight, is still smoking, has COPD. And uses oxygen has =never been tested for sleep apnea, BP is 120.80, she is on Bumex  1mg bid, Aldactone 25mg and jardiance, has 3 plus edema, also has fatty liver, will increase Aldactone to 50 mg, patient is taking also Neurontin high dose 800 mg bid, plus Meloxican which can be the culprit of the edema, in addition to the ESTEVAN and COPD plus diastolic dysfunction, and liver disease, will get a sleep test, advised to discuss with PCP about weight loss treatment and possible surgery,  she was advised to       discuss with back doctor regarding her meds that can be causing edema legs    The following portions of the patient's history were reviewed and updated as appropriate: allergies, current medications, past family history, past medical history, past social history, past surgical history, and problem list.    Medications:  albuterol  albuterol sulfate HFA  bumetanide  docusate sodium capsule  empagliflozin tablet  FLUoxetine  fluticasone  gabapentin  HYDROcodone-acetaminophen  hydrOXYzine  levothyroxine  meloxicam  montelukast  nystatin  ondansetron ODT  oxybutynin  pantoprazole  promethazine-dextromethorphan  spironolactone  tiotropium  tiZANidine  traZODone    Subjective  Allergies   Allergen Reactions    Sulfa Antibiotics Rash, Itching, GI Intolerance and Hives    Adhesive Tape Rash        Past Medical History:   Diagnosis  "Date    Multilevel degenerative disc disease        History reviewed. No pertinent surgical history.    History reviewed. No pertinent family history.     Social History     Socioeconomic History    Marital status: Single   Tobacco Use    Smoking status: Every Day     Current packs/day: 1.00     Average packs/day: 1 pack/day for 40.0 years (40.0 ttl pk-yrs)     Types: Cigarettes    Smokeless tobacco: Never   Vaping Use    Vaping status: Never Used   Substance and Sexual Activity    Alcohol use: Not Currently    Drug use: Never    Sexual activity: Defer       Review of Systems   Constitutional: Negative.    HENT: Negative.     Respiratory: Negative.     Cardiovascular: Negative.    Endocrine: Negative.    Genitourinary: Negative.    Musculoskeletal: Negative.    Skin: Negative.    Allergic/Immunologic: Negative.    Neurological: Negative.    Hematological: Negative.    Psychiatric/Behavioral: Negative.         Cardiovascular Procedures    ECHO/MUGA:  STRESS TESTS:   CARDIAC CATH:   DEVICES:   HOLTER:   CT/MRI:   VASCULAR:   CARDIOTHORACIC:     Objective  Vitals:    12/24/24 0920   BP: 138/74   BP Location: Right arm   Patient Position: Lying   Cuff Size: Adult   Pulse: 83   Resp: 18   Temp: 97 °F (36.1 °C)   TempSrc: Infrared   SpO2: 96%   Weight: (!) 147 kg (325 lb)   Height: 175.3 cm (69\")   PainSc: 0-No pain     Body mass index is 47.99 kg/m².     Physical Exam  Vitals reviewed.   Constitutional:       Appearance: Healthy appearance. Not in distress.   Neck:      Vascular: No JVR. JVD normal.   Pulmonary:      Effort: Pulmonary effort is normal.      Breath sounds: Normal breath sounds. No wheezing. No rhonchi. No rales.   Chest:      Chest wall: Not tender to palpatation.   Cardiovascular:      PMI at left midclavicular line. Normal rate. Regular rhythm. Normal S1. Normal S2.       Murmurs: There is no murmur.      No gallop.  No click. No rub.   Pulses:     Intact distal pulses.   Edema:     Thigh: bilateral 3+ " edema of the thigh.     Pretibial: bilateral 3+ edema of the pretibial area.     Ankle: bilateral 3+ edema of the ankle.     Feet: bilateral 3+ edema of the feet.  Abdominal:      General: Bowel sounds are normal.      Palpations: Abdomen is soft.      Tenderness: There is no abdominal tenderness.   Musculoskeletal: Normal range of motion.         General: No tenderness. Skin:     General: Skin is warm and dry.   Neurological:      General: No focal deficit present.      Mental Status: Alert and oriented to person, place and time.        Diagnostic Data    ECG 12 Lead    Date/Time: 12/24/2024 10:02 AM  Performed by: Flex Steinberg MD    Authorized by: Flex Steinberg MD  Comparison: compared with previous ECG from 5/11/2023  Similar to previous ECG  Rhythm: sinus rhythm  Rate: normal  BPM: 87  QRS axis: normal  Other findings: right atrial abnormality    Clinical impression: normal ECG        Assessment and Plan  Diagnoses and all orders for this visit:    Edema leg- likely multifactorial, obesity, ESTEVAN.Diastolic dysfunction, meds neurontin, meloxican, liver fatty, will ibcrease Aldactone to 50 mg, keep bumex 1mg bid, advised to eat with low salt, needs to lose weight  -     Basic Metabolic Panel  -     proBNP  -     Protein / Creatinine Ratio, Urine - Urine, Clean Catch  -     Home Sleep Study; Future    Shortness of breath- Likely related to Obesity, ESTEVAN< diastolic dysfunction, COPD, needs to lose weight  -     Basic Metabolic Panel  -     proBNP  -     Protein / Creatinine Ratio, Urine - Urine, Clean Catch  -     Home Sleep Study; Future    Morbid obesity with BMI of 45.0-49.9, adult- advised to lose weight  -     Home Sleep Study; Future    Diastolic congestive heart failure, unspecified HF chronicity- on jardiance, Aldactone and Bumex, needs to lose weight, otherwise will go down the hill  -     Home Sleep Study; Future    Other orders  -     spironolactone (ALDACTONE) 50 MG tablet; Take 1 tablet  by mouth Daily.         Return in about 3 months (around 3/24/2025) for Recheck with Dr. Steinberg.    Flex Steinberg MD  12/24/2024

## 2024-12-25 LAB
BUN SERPL-MCNC: 15 MG/DL (ref 8–27)
BUN/CREAT SERPL: 15 (ref 12–28)
CALCIUM SERPL-MCNC: 9.1 MG/DL (ref 8.7–10.3)
CHLORIDE SERPL-SCNC: 102 MMOL/L (ref 96–106)
CO2 SERPL-SCNC: 24 MMOL/L (ref 20–29)
CREAT SERPL-MCNC: 0.98 MG/DL (ref 0.57–1)
CREAT UR-MCNC: 110.4 MG/DL
EGFRCR SERPLBLD CKD-EPI 2021: 65 ML/MIN/1.73
GLUCOSE SERPL-MCNC: 80 MG/DL (ref 70–99)
NT-PROBNP SERPL-MCNC: 154 PG/ML (ref 0–287)
POTASSIUM SERPL-SCNC: 4 MMOL/L (ref 3.5–5.2)
PROT UR-MCNC: 21.5 MG/DL
PROT/CREAT UR: 195 MG/G CREAT (ref 0–200)
SODIUM SERPL-SCNC: 141 MMOL/L (ref 134–144)

## 2024-12-27 ENCOUNTER — TELEPHONE (OUTPATIENT)
Dept: CARDIOLOGY | Facility: CLINIC | Age: 64
End: 2024-12-27
Payer: MEDICAID

## 2024-12-27 NOTE — TELEPHONE ENCOUNTER
----- Message from Flex Steinberg sent at 12/25/2024  8:13 AM EST -----  BNP is normal, and no major amount of protein in the urine, these suggest that is possible that the neurontin and  also pain meds is causing or aggravating the swelling plus possible sleep apnea,  we just order the sleep test

## 2024-12-27 NOTE — TELEPHONE ENCOUNTER
LVM to call back. Her blood test BNP was normal, urine test showed no major amount of protein. This suggest that it is possible the neurotin and pain medications could be causing/aggravating  swelling. Also keep in mind possible sleep apnea. We will review that test once we receive the results. HUB may share.

## 2024-12-30 NOTE — TELEPHONE ENCOUNTER
Spoke with Ms. Avilez and went over the following:  BNP is normal, and no major amount of protein in the urine, these suggest that is possible that the neurontin and also pain meds is causing or aggravating the swelling plus possible sleep apnea, we just order the sleep test     She is agreeable to sleep test but wants to wait until 1/1/25 to send due to new insurance.

## 2025-01-08 ENCOUNTER — TELEMEDICINE (OUTPATIENT)
Dept: FAMILY MEDICINE CLINIC | Facility: CLINIC | Age: 65
End: 2025-01-08
Payer: MEDICAID

## 2025-01-08 VITALS — HEIGHT: 69 IN | WEIGHT: 293 LBS | BODY MASS INDEX: 43.4 KG/M2

## 2025-01-08 DIAGNOSIS — Z59.00 HOMELESSNESS: ICD-10-CM

## 2025-01-08 DIAGNOSIS — I50.32 CHRONIC DIASTOLIC CONGESTIVE HEART FAILURE: Primary | ICD-10-CM

## 2025-01-08 DIAGNOSIS — J41.8 MIXED SIMPLE AND MUCOPURULENT CHRONIC BRONCHITIS: ICD-10-CM

## 2025-01-08 PROCEDURE — 1126F AMNT PAIN NOTED NONE PRSNT: CPT | Performed by: PHYSICIAN ASSISTANT

## 2025-01-08 PROCEDURE — 99214 OFFICE O/P EST MOD 30 MIN: CPT | Performed by: PHYSICIAN ASSISTANT

## 2025-01-08 PROCEDURE — 1159F MED LIST DOCD IN RCRD: CPT | Performed by: PHYSICIAN ASSISTANT

## 2025-01-08 PROCEDURE — 1160F RVW MEDS BY RX/DR IN RCRD: CPT | Performed by: PHYSICIAN ASSISTANT

## 2025-01-08 RX ORDER — AZITHROMYCIN 250 MG/1
TABLET, FILM COATED ORAL
COMMUNITY
Start: 2025-01-04

## 2025-01-08 SDOH — ECONOMIC STABILITY - HOUSING INSECURITY: HOMELESSNESS UNSPECIFIED: Z59.00

## 2025-01-08 NOTE — ASSESSMENT & PLAN NOTE
Seen and evaluated by cardiology, Spironolactone was added, and swelling is slightly better, but she is not doing much to help, she eats high sodium foods and living in her car prevents her from elevating her legs.

## 2025-01-08 NOTE — ASSESSMENT & PLAN NOTE
They still do not have an apartment, I cannot determine by our conversation why they cannot get assistance for housing, they just tell me there's no one to help them.  I've made several suggestions about where to go for help.

## 2025-01-08 NOTE — PROGRESS NOTES
"Chief Complaint  Congestive Heart Failure (Pt is being seen for a follow up today)    Subjective          Anastasiia Avilez presents to [facility]    Patient was seen today through synchronous audio/video technology. Verbal consent was obtained. For the purpose of this visit the provider is located at Home working for [facility].  The patient was located at  in her car where she is currently living . Vitals signs were not obtained due to lack of home monitoring access. Time spent with patient was 20 minutes.    Congestive Heart Failure  Associated symptoms include shortness of breath. Pertinent negatives include no chest pain, fatigue or palpitations.     Patient states she is feeling better, her SOA is improved.  She still has swelling, but that is partly due to her circumstances, she is living in her car again. She is unable to stretch out and elevate her legs.  She refuses to go to a shelter in [place] and says there is no place her in town she can go to.  She also continues to smoke.     Objective   Vital Signs:   Ht 175.3 cm (69\")   Wt (!) 147 kg (325 lb) Comment: Pt reported vitals  BMI 47.99 kg/m²     Body mass index is 47.99 kg/m².    Review of Systems   Constitutional:  Negative for chills, fatigue and fever.   Respiratory:  Positive for cough and shortness of breath. Negative for wheezing.    Cardiovascular:  Positive for leg swelling. Negative for chest pain and palpitations.       Past History:  Medical History: has a past medical history of Multilevel degenerative disc disease.   Surgical History: has no past surgical history on file.   Family History: family history is not on file.   Social History: reports that she has been smoking cigarettes. She has a 40 pack-year smoking history. She has never used smokeless tobacco. She reports that she does not currently use alcohol. She reports that she does not use drugs.      Current Outpatient Medications:     " albuterol (PROVENTIL) (2.5 MG/3ML) 0.083% nebulizer solution, Take 2.5 mg by nebulization Every 4 (Four) Hours As Needed for Wheezing., Disp: 60 each, Rfl: 12    albuterol sulfate  (90 Base) MCG/ACT inhaler, Inhale 2 puffs Every 4 (Four) Hours As Needed for Wheezing., Disp: 18 g, Rfl: 12    amoxicillin-clavulanate (AUGMENTIN) 875-125 MG per tablet, Take  by mouth., Disp: , Rfl:     azithromycin (ZITHROMAX) 250 MG tablet, 0 .ROUTE .COMPLEX, Disp: , Rfl:     bumetanide (BUMEX) 1 MG tablet, Take 1 tablet by mouth 2 (Two) Times a Day., Disp: 60 tablet, Rfl: 5    docusate sodium 100 MG capsule, Take 1 capsule by mouth Daily., Disp: 90 capsule, Rfl: 3    empagliflozin (Jardiance) 25 MG tablet tablet, Take 1 tablet by mouth Daily., Disp: 30 tablet, Rfl: 5    FLUoxetine (PROzac) 40 MG capsule, Take 1 capsule by mouth Daily., Disp: 90 capsule, Rfl: 3    fluticasone (FLONASE) 50 MCG/ACT nasal spray, 2 sprays by Each Nare route Daily., Disp: 48 g, Rfl: 5    gabapentin (NEURONTIN) 800 MG tablet, Take 1 tablet by mouth 3 times a day., Disp: , Rfl:     HYDROcodone-acetaminophen (NORCO) 7.5-325 MG per tablet, Take 1 tablet by mouth 3 times a day. (Patient taking differently: Take 1 tablet by mouth Every 6 (Six) Hours As Needed.), Disp: , Rfl:     hydrOXYzine (ATARAX) 25 MG tablet, Take 1 tablet by mouth 3 (Three) Times a Day., Disp: 90 tablet, Rfl: 3    levothyroxine (SYNTHROID, LEVOTHROID) 75 MCG tablet, Take 1 tablet by mouth Daily., Disp: 90 tablet, Rfl: 3    meloxicam (MOBIC) 7.5 MG tablet, , Disp: , Rfl:     montelukast (SINGULAIR) 10 MG tablet, Take 1 tablet by mouth Every Night., Disp: 90 tablet, Rfl: 3    nystatin (MYCOSTATIN) 276287 UNIT/GM powder, Apply  topically to the appropriate area as directed 3 (Three) Times a Day., Disp: 60 g, Rfl: 5    ondansetron ODT (ZOFRAN-ODT) 4 MG disintegrating tablet, Place 1 tablet on the tongue Every 8 (Eight) Hours As Needed for Nausea or Vomiting., Disp: 90 tablet, Rfl: 1     "oxybutynin (DITROPAN) 5 MG tablet, Take 1 tablet by mouth 2 (Two) Times a Day., Disp: 180 tablet, Rfl: 3    pantoprazole (PROTONIX) 40 MG EC tablet, Take 1 tablet by mouth Daily., Disp: 90 tablet, Rfl: 3    promethazine-dextromethorphan (PROMETHAZINE-DM) 6.25-15 MG/5ML syrup, Take 5 mL by mouth 4 (Four) Times a Day As Needed for Cough., Disp: 240 mL, Rfl: 0    spironolactone (ALDACTONE) 50 MG tablet, Take 1 tablet by mouth Daily., Disp: 90 tablet, Rfl: 3    tiotropium (Spiriva HandiHaler) 18 MCG per inhalation capsule, Place 1 capsule into inhaler and inhale Daily., Disp: 30 capsule, Rfl: 5    tiZANidine (ZANAFLEX) 4 MG tablet, Take 1 tablet by mouth 3 (Three) Times a Day As Needed for Muscle Spasms., Disp: 270 tablet, Rfl: 3    traZODone (DESYREL) 150 MG tablet, TAKE 1 TO 2 TABLETS BY MOUTH EVERY NIGHT AT BEDTIME, Disp: 180 tablet, Rfl: 3    Allergies: Sulfa antibiotics and Adhesive tape    Physical Exam  Constitutional:       Appearance: Normal appearance.   Neurological:      Mental Status: She is alert and oriented to person, place, and time.   Psychiatric:         Mood and Affect: Mood normal.         Behavior: Behavior normal.          Result Review :                   Assessment and Plan    Diagnoses and all orders for this visit:    1. Chronic diastolic congestive heart failure (Primary)  Assessment & Plan:  Seen and evaluated by cardiology, Spironolactone was added, and swelling is slightly better, but she is not doing much to help, she eats high sodium foods and living in her car prevents her from elevating her legs.            2. Mixed simple and mucopurulent chronic bronchitis  Assessment & Plan:  She is living in her car again, and is not able to use her oxygen or nebulizer, she says she is \"doing OK right now\" I tried to convince her to go somewhere for help, but she says they are fine and they choose to continue living in their car.  I'm at a loss as to how to help them.        3. " Homelessness  Assessment & Plan:  They still do not have an apartment, I cannot determine by our conversation why they cannot get assistance for housing, they just tell me there's no one to help them.  I've made several suggestions about where to go for help.            Follow Up   Return in about 3 months (around 4/8/2025) for Recheck.  Patient was given instructions and counseling regarding her condition or for health maintenance advice. Please see specific information pulled into the AVS if appropriate.     Radha Valladares PA-C

## 2025-01-08 NOTE — ASSESSMENT & PLAN NOTE
"She is living in her car again, and is not able to use her oxygen or nebulizer, she says she is \"doing OK right now\" I tried to convince her to go somewhere for help, but she says they are fine and they choose to continue living in their car.  I'm at a loss as to how to help them.    "

## 2025-01-10 RX ORDER — ONDANSETRON 4 MG/1
TABLET, ORALLY DISINTEGRATING ORAL
Qty: 90 TABLET | Refills: 1 | Status: SHIPPED | OUTPATIENT
Start: 2025-01-10

## 2025-01-23 ENCOUNTER — TELEPHONE (OUTPATIENT)
Dept: FAMILY MEDICINE CLINIC | Facility: CLINIC | Age: 65
End: 2025-01-23
Payer: MEDICAID

## 2025-01-23 NOTE — TELEPHONE ENCOUNTER
PATIENT CALLED [facility] [employer] OFFERED HER A SAME DAY TODAY.. SHE DIDN'T WANT .. IS GOING TO ER,, ADVISED WE WOULD NEED TO SEE AND TAKE CARE OF HER WHY APPT. SO SHE WILL GO TO ER

## 2025-02-06 RX ORDER — ONDANSETRON 4 MG/1
4 TABLET, ORALLY DISINTEGRATING ORAL EVERY 8 HOURS PRN
Qty: 90 TABLET | Refills: 1 | Status: SHIPPED | OUTPATIENT
Start: 2025-02-06

## 2025-02-06 NOTE — TELEPHONE ENCOUNTER
Caller: Anastasiia Avilez    Relationship: Self    Best call back number: [phone]     Requested Prescriptions:   Requested Prescriptions     Pending Prescriptions Disp Refills    ondansetron ODT (ZOFRAN-ODT) 4 MG disintegrating tablet 90 tablet 1        Pharmacy where request should be sent: [facility] [id number] [facility], [place] - [address] [facility] AT [facility] [address] - [phone]  - [phone] FX     Last office visit with prescribing clinician: 12/11/2024   Last telemedicine visit with prescribing clinician: 1/8/2025   Next office visit with prescribing clinician: 4/15/2025     Additional details provided by patient:     Does the patient have less than a 3 day supply:  [x] Yes  [] No    Would you like a call back once the refill request has been completed: [] Yes [x] No    If the office needs to give you a call back, can they leave a voicemail: [] Yes [x] No    Evelyn Mart Rep   02/06/25 15:39 EST

## 2025-02-06 NOTE — TELEPHONE ENCOUNTER
ALSO NEEDS THE OTHER THYROID MEDICATION BUT NOT SURE OF THE NAME OF IT. OUT OF BOTH    Caller: Anastasiia Avilez    Relationship: Self    Best call back number: [phone]     Requested Prescriptions: ALSO NEEDS THE OTHER THYROID MEDICATION BUT NOT SURE OF THE NAME OF IT. OUT OF BOTH  Requested Prescriptions     Pending Prescriptions Disp Refills    levothyroxine (SYNTHROID, LEVOTHROID) 75 MCG tablet 90 tablet 3     Sig: Take 1 tablet by mouth Daily.        Pharmacy where request should be sent: [facility] [id number] [address] AT [facility] [address] [facility] - [phone] [facility] [phone] FX     Last office visit with prescribing clinician: 12/11/2024   Last telemedicine visit with prescribing clinician: 1/8/2025   Next office visit with prescribing clinician: 4/15/2025     Additional details provided by patient: ALSO NEEDS THE OTHER THYROID MEDICATION BUT NOT SURE OF THE NAME OF IT. OUT OF BOTH    Does the patient have less than a 3 day supply:  [x] Yes  [] No    Would you like a call back once the refill request has been completed: [] Yes [x] No    If the office needs to give you a call back, can they leave a voicemail: [] Yes [x] No    Evelyn Valenzuela Rep   02/06/25 12:46 EST

## 2025-02-07 RX ORDER — LEVOTHYROXINE SODIUM 75 UG/1
75 TABLET ORAL DAILY
Qty: 90 TABLET | Refills: 3 | Status: SHIPPED | OUTPATIENT
Start: 2025-02-07

## 2025-02-10 ENCOUNTER — TELEPHONE (OUTPATIENT)
Dept: FAMILY MEDICINE CLINIC | Facility: CLINIC | Age: 65
End: 2025-02-10
Payer: MEDICAID

## 2025-02-10 NOTE — TELEPHONE ENCOUNTER
Caller: Anastasiia Avilez    Relationship: Self    Best call back number: [phone]     Requested Prescriptions:   Requested Prescriptions      No prescriptions requested or ordered in this encounter    THYROID MEDICATION    Pharmacy where request should be sent: [facility] [id number] - [address] AT [facility] [address] - [phone]  - [phone] FX     Last office visit with prescribing clinician: 12/11/2024   Last telemedicine visit with prescribing clinician: 1/8/2025   Next office visit with prescribing clinician: 4/15/2025     Additional details provided by patient: THYROID MEDICATION    Does the patient have less than a 3 day supply:  [x] Yes  [] No        Evelyn Ramsay Rep   02/10/25 16:19 EST

## 2025-02-11 NOTE — TELEPHONE ENCOUNTER
Medication sent on 07/07/25. Advised patient to call the pharmacy.      Contacted pharmacy and medication is ready to be picked up.

## 2025-03-10 ENCOUNTER — TELEPHONE (OUTPATIENT)
Dept: FAMILY MEDICINE CLINIC | Facility: CLINIC | Age: 65
End: 2025-03-10
Payer: MEDICAID

## 2025-03-10 NOTE — TELEPHONE ENCOUNTER
Caller: Anastasiia Avilez    Relationship: Self    Best call back number: [phone]     What medication are you requesting: CHANTIX     What are your current symptoms: SMOKING CESSATION     How long have you been experiencing symptoms: HAS BEEN A SMOKER FOR ABOUT 50 YEARS     Have you had these symptoms before:    [x] Yes  [] No    Have you been treated for these symptoms before:   [x] Yes  [] No    If a prescription is needed, what is your preferred pharmacy and phone number: [facility] [id number] - [place], [place] - [address] [facility] AT [facility] [address] [facility] - [phone] [facility] [phone] FX     Additional notes: THE PATIENT REPORTS THAT SHE WAS ADVISED BY HER BARIATRIC SURGEON THAT SHE HAS TO STOP SMOKING BEFORE THEY WILL DO SURGERY ON HER AND ADVISED THE PATIENT TO REQUEST CHANTIX FROM HER PCP.      PLEASE CALL IF ANY QUESTIONS OR CONCERNS.

## 2025-03-24 RX ORDER — ALBUTEROL SULFATE 90 UG/1
2 AEROSOL, METERED RESPIRATORY (INHALATION) EVERY 4 HOURS PRN
Qty: 18 G | Refills: 12 | OUTPATIENT
Start: 2025-03-24

## 2025-03-31 RX ORDER — ALBUTEROL SULFATE 90 UG/1
2 INHALANT RESPIRATORY (INHALATION) EVERY 4 HOURS PRN
Qty: 18 G | Refills: 12 | Status: SHIPPED | OUTPATIENT
Start: 2025-03-31

## 2025-03-31 RX ORDER — ONDANSETRON 4 MG/1
4 TABLET, ORALLY DISINTEGRATING ORAL EVERY 8 HOURS PRN
Qty: 90 TABLET | Refills: 1 | Status: SHIPPED | OUTPATIENT
Start: 2025-03-31

## 2025-03-31 NOTE — TELEPHONE ENCOUNTER
Caller: Anastasiia Avilez    Relationship: Self    Best call back number: [phone]     Requested Prescriptions:   Requested Prescriptions     Pending Prescriptions Disp Refills    albuterol sulfate  (90 Base) MCG/ACT inhaler 18 g 12     Sig: Inhale 2 puffs Every 4 (Four) Hours As Needed for Wheezing.    ondansetron ODT (ZOFRAN-ODT) 4 MG disintegrating tablet 90 tablet 1     Sig: Place 1 tablet on the tongue Every 8 (Eight) Hours As Needed for Nausea or Vomiting.        Pharmacy where request should be sent: [facility] [id number] [place], [place] - [address] [facility] AT [facility] [address] - [phone] [facility] [phone] FX     Last office visit with prescribing clinician: 12/11/2024   Last telemedicine visit with prescribing clinician: 1/8/2025   Next office visit with prescribing clinician: 4/15/2025     Additional details provided by patient: PATIENT IS COMPLETELY    Does the patient have less than a 3 day supply:  [x] Yes  [] No    Evelyn Lai   03/31/25 10:08 EDT

## 2025-04-03 ENCOUNTER — OUTSIDE FACILITY SERVICE (OUTPATIENT)
Dept: CARDIOLOGY | Facility: CLINIC | Age: 65
End: 2025-04-03
Payer: MEDICAID

## 2025-04-03 PROCEDURE — 93306 TTE W/DOPPLER COMPLETE: CPT | Performed by: INTERNAL MEDICINE

## 2025-04-04 ENCOUNTER — OUTSIDE FACILITY SERVICE (OUTPATIENT)
Dept: CARDIOLOGY | Facility: CLINIC | Age: 65
End: 2025-04-04
Payer: MEDICAID

## 2025-04-04 PROCEDURE — 99222 1ST HOSP IP/OBS MODERATE 55: CPT | Performed by: INTERNAL MEDICINE

## 2025-04-07 ENCOUNTER — OUTSIDE FACILITY SERVICE (OUTPATIENT)
Dept: CARDIOLOGY | Facility: CLINIC | Age: 65
End: 2025-04-07
Payer: MEDICAID

## 2025-04-07 PROCEDURE — 99231 SBSQ HOSP IP/OBS SF/LOW 25: CPT | Performed by: INTERNAL MEDICINE

## 2025-04-08 ENCOUNTER — OUTSIDE FACILITY SERVICE (OUTPATIENT)
Dept: CARDIOLOGY | Facility: CLINIC | Age: 65
End: 2025-04-08
Payer: MEDICAID

## 2025-04-08 PROCEDURE — 99231 SBSQ HOSP IP/OBS SF/LOW 25: CPT | Performed by: INTERNAL MEDICINE

## 2025-04-10 NOTE — TELEPHONE ENCOUNTER
Caller: Anastasiia Avilez    Relationship: Self    Best call back number: [phone]    Requested Prescriptions:   Requested Prescriptions     Pending Prescriptions Disp Refills    tiotropium (Spiriva HandiHaler) 18 MCG per inhalation capsule 30 capsule 5     Sig: Place 1 capsule into inhaler and inhale Daily.    ondansetron ODT (ZOFRAN-ODT) 4 MG disintegrating tablet 90 tablet 1     Sig: Place 1 tablet on the tongue Every 8 (Eight) Hours As Needed for Nausea or Vomiting.        Pharmacy where request should be sent: [facility] [id number] [place], [place] - [address] [facility] AT [facility] [address] - [phone]  - [phone] FX     Last office visit with prescribing clinician: 12/11/2024   Last telemedicine visit with prescribing clinician: 1/8/2025   Next office visit with prescribing clinician: 4/15/2025     Additional details provided by patient:     Does the patient have less than a 3 day supply:  [x] Yes  [] No    Would you like a call back once the refill request has been completed: [] Yes [x] No    If the office needs to give you a call back, can they leave a voicemail: [] Yes [x] No    Evelyn Can Rep   04/10/25 14:54 EDT

## 2025-04-11 RX ORDER — TIOTROPIUM BROMIDE 18 UG/1
1 CAPSULE ORAL; RESPIRATORY (INHALATION)
Qty: 30 CAPSULE | Refills: 5 | Status: SHIPPED | OUTPATIENT
Start: 2025-04-11

## 2025-04-11 RX ORDER — ONDANSETRON 4 MG/1
4 TABLET, ORALLY DISINTEGRATING ORAL EVERY 8 HOURS PRN
Qty: 90 TABLET | Refills: 1 | OUTPATIENT
Start: 2025-04-11

## 2025-04-15 ENCOUNTER — OFFICE VISIT (OUTPATIENT)
Dept: FAMILY MEDICINE CLINIC | Facility: CLINIC | Age: 65
End: 2025-04-15
Payer: MEDICAID

## 2025-04-15 VITALS
OXYGEN SATURATION: 93 % | DIASTOLIC BLOOD PRESSURE: 68 MMHG | HEIGHT: 69 IN | WEIGHT: 293 LBS | BODY MASS INDEX: 43.4 KG/M2 | HEART RATE: 81 BPM | SYSTOLIC BLOOD PRESSURE: 144 MMHG

## 2025-04-15 DIAGNOSIS — E03.9 ACQUIRED HYPOTHYROIDISM: ICD-10-CM

## 2025-04-15 DIAGNOSIS — R26.81 UNSTEADY GAIT WHEN WALKING: ICD-10-CM

## 2025-04-15 DIAGNOSIS — I50.32 CHRONIC DIASTOLIC CONGESTIVE HEART FAILURE: Primary | ICD-10-CM

## 2025-04-15 DIAGNOSIS — R06.02 SHORTNESS OF BREATH: ICD-10-CM

## 2025-04-15 DIAGNOSIS — R60.0 PERIPHERAL EDEMA: ICD-10-CM

## 2025-04-15 DIAGNOSIS — J41.8 MIXED SIMPLE AND MUCOPURULENT CHRONIC BRONCHITIS: ICD-10-CM

## 2025-04-15 DIAGNOSIS — G47.33 OBSTRUCTIVE SLEEP APNEA SYNDROME: ICD-10-CM

## 2025-04-15 DIAGNOSIS — Z59.00 HOMELESSNESS: ICD-10-CM

## 2025-04-15 DIAGNOSIS — Z91.81 AT HIGH RISK FOR INJURY RELATED TO FALL: ICD-10-CM

## 2025-04-15 DIAGNOSIS — M47.817 LUMBOSACRAL SPONDYLOSIS WITHOUT MYELOPATHY: ICD-10-CM

## 2025-04-15 DIAGNOSIS — E66.01 MORBID OBESITY: ICD-10-CM

## 2025-04-15 DIAGNOSIS — M54.16 LUMBAR RADICULOPATHY: ICD-10-CM

## 2025-04-15 SDOH — ECONOMIC STABILITY - HOUSING INSECURITY: HOMELESSNESS UNSPECIFIED: Z59.00

## 2025-04-15 NOTE — PROGRESS NOTES
"Her ability to ambulate Chief Complaint  Follow-up (Med refill, discuss gastric bypass , discuss shower chair and wheel chair)    Subjective          Anastasiia Avilez presents to [facility]    History of Present Illness she is here today to discuss medication refills and getting a shower chair and new wheelchair as well as labs that were ordered for an impending gastric bypass surgery.  She apparently has paperwork stating that I need to review some labs that was done by the bariatric office in [place] but unfortunately these labs are not available for review today.  She has no specific complaints today.  She does report that they are still homeless and living in their vehicle but they are looking for permanent housing hopefully before her gastric bypass surgery.      Objective   Vital Signs:   /68 (BP Location: Left arm, Patient Position: Sitting, Cuff Size: Large Adult)   Pulse 81   Ht 175.3 cm (69\")   Wt (!) 158 kg (348 lb)   SpO2 93%   BMI 51.39 kg/m²     Body mass index is 51.39 kg/m².    Review of Systems   Constitutional:  Negative for chills, fatigue and fever.   Respiratory:  Positive for shortness of breath (chronic and unchanged). Negative for cough and wheezing.    Cardiovascular:  Positive for leg swelling. Negative for chest pain and palpitations.   Musculoskeletal:  Positive for arthralgias, back pain and gait problem. Negative for myalgias.   Neurological:  Positive for weakness. Negative for dizziness and headache.   Psychiatric/Behavioral:  Negative for depressed mood. The patient is not nervous/anxious.        Past History:  Medical History: has a past medical history of Multilevel degenerative disc disease.   Surgical History: has no past surgical history on file.   Family History: family history is not on file.   Social History: reports that she has been smoking cigarettes. She has a 40 pack-year smoking history. She has never used smokeless tobacco. She " reports that she does not currently use alcohol. She reports that she does not use drugs.      Current Outpatient Medications:     albuterol (PROVENTIL) (2.5 MG/3ML) 0.083% nebulizer solution, Take 2.5 mg by nebulization Every 4 (Four) Hours As Needed for Wheezing., Disp: 60 each, Rfl: 12    albuterol sulfate  (90 Base) MCG/ACT inhaler, Inhale 2 puffs Every 4 (Four) Hours As Needed for Wheezing., Disp: 18 g, Rfl: 12    bumetanide (BUMEX) 1 MG tablet, Take 1 tablet by mouth 2 (Two) Times a Day., Disp: 60 tablet, Rfl: 5    docusate sodium 100 MG capsule, Take 1 capsule by mouth Daily., Disp: 90 capsule, Rfl: 3    empagliflozin (Jardiance) 25 MG tablet tablet, Take 1 tablet by mouth Daily., Disp: 30 tablet, Rfl: 5    FLUoxetine (PROzac) 40 MG capsule, Take 1 capsule by mouth Daily., Disp: 90 capsule, Rfl: 3    fluticasone (FLONASE) 50 MCG/ACT nasal spray, 2 sprays by Each Nare route Daily., Disp: 48 g, Rfl: 5    gabapentin (NEURONTIN) 800 MG tablet, Take 1 tablet by mouth 3 times a day., Disp: , Rfl:     HYDROcodone-acetaminophen (NORCO) 7.5-325 MG per tablet, Take 1 tablet by mouth 3 times a day. (Patient taking differently: Take 1 tablet by mouth Every 6 (Six) Hours As Needed.), Disp: , Rfl:     hydrOXYzine (ATARAX) 25 MG tablet, Take 1 tablet by mouth 3 (Three) Times a Day., Disp: 90 tablet, Rfl: 3    levothyroxine (SYNTHROID, LEVOTHROID) 75 MCG tablet, Take 1 tablet by mouth Daily., Disp: 90 tablet, Rfl: 3    meloxicam (MOBIC) 7.5 MG tablet, , Disp: , Rfl:     montelukast (SINGULAIR) 10 MG tablet, Take 1 tablet by mouth Every Night., Disp: 90 tablet, Rfl: 3    nystatin (MYCOSTATIN) 508185 UNIT/GM powder, Apply  topically to the appropriate area as directed 3 (Three) Times a Day., Disp: 60 g, Rfl: 5    ondansetron ODT (ZOFRAN-ODT) 4 MG disintegrating tablet, Place 1 tablet on the tongue Every 8 (Eight) Hours As Needed for Nausea or Vomiting., Disp: 90 tablet, Rfl: 1    oxybutynin (DITROPAN) 5 MG tablet, Take  1 tablet by mouth 2 (Two) Times a Day., Disp: 180 tablet, Rfl: 3    pantoprazole (PROTONIX) 40 MG EC tablet, Take 1 tablet by mouth Daily., Disp: 90 tablet, Rfl: 3    spironolactone (ALDACTONE) 50 MG tablet, Take 1 tablet by mouth Daily., Disp: 90 tablet, Rfl: 3    tiotropium (Spiriva HandiHaler) 18 MCG per inhalation capsule, Place 1 capsule into inhaler and inhale Daily., Disp: 30 capsule, Rfl: 5    tiZANidine (ZANAFLEX) 4 MG tablet, Take 1 tablet by mouth 3 (Three) Times a Day As Needed for Muscle Spasms., Disp: 270 tablet, Rfl: 3    traZODone (DESYREL) 150 MG tablet, TAKE 1 TO 2 TABLETS BY MOUTH EVERY NIGHT AT BEDTIME, Disp: 180 tablet, Rfl: 3    Misc. Devices (Adjust Bath/Shower Seat) misc, Use 1 each Daily As Needed (For assist with shower). Unsteady gait with High fall risk, chronic SOB due to underlying COPD,, Disp: 1 each, Rfl: 0    Allergies: Sulfa antibiotics and Adhesive tape    Physical Exam  Constitutional:       Appearance: She is obese.   Cardiovascular:      Rate and Rhythm: Normal rate and regular rhythm.      Heart sounds: Normal heart sounds. Heart sounds are distant.   Pulmonary:      Effort: Pulmonary effort is normal.      Breath sounds: Decreased breath sounds present. No wheezing, rhonchi or rales.   Neurological:      Mental Status: She is alert and oriented to person, place, and time.   Psychiatric:         Mood and Affect: Mood normal.         Behavior: Behavior normal.          Result Review :                   Assessment and Plan    Diagnoses and all orders for this visit:    1. Chronic diastolic congestive heart failure (Primary)  Assessment & Plan:  This is managed by cardiology, she has had frequent hospitalizations related to this. Her ability to ambulate  Is significantly reduced due to her chronic shortness of breath from this and therefore I believe a wheelchair is a reasonable request.    Orders:  -     Special Size Custom Wheelchair    2. Mixed simple and mucopurulent chronic  "bronchitis  Assessment & Plan:  She is living in her car again, and is not able to use her oxygen or nebulizer, she says she is \"doing OK right now\"   They are looking for permanent housing prior to her planned gastric sleeve surgery. Her ability to ambulate  Is significantly reduced due to her chronic shortness of breath from this and therefore I believe a wheelchair is a reasonable request.    Orders:  -     Special Size Custom Wheelchair    3. Shortness of breath  Assessment & Plan:  This is chronic and unchanged she does use her inhalers and they do help she states right now she is stable and doing fine.    Orders:  -     Special Size Custom Wheelchair    4. Lumbar radiculopathy  Assessment & Plan:  Managed by pain management.     Orders:  -     Special Size Custom Wheelchair    5. Lumbosacral spondylosis without myelopathy  Assessment & Plan:  Managed by pain management.     Orders:  -     Special Size Custom Wheelchair    6. Unsteady gait when walking  Assessment & Plan:  She is at increased fall risk with her morbid obesity, generalized muscle weakness, chronic pain and shortness of breath.  I do feel she would benefit from having a wheelchair and this has been ordered.  I also ordered the shower chair that she requested again because of her fall risk and she assures me that she is able to use that at the truck stop where they take regular showers.    Orders:  -     Special Size Custom Wheelchair    7. At high risk for injury related to fall  Assessment & Plan:  Patient is high risk for falls due to unsteady gait, generalized muscle weakness and extreme obesity.    Orders:  -     Special Size Custom Wheelchair    8. Acquired hypothyroidism  Assessment & Plan:  Will evaluate labs and determine whether adjustments in medication are indicated.  Labs done recently at the [facility].       9. Morbid obesity  Assessment & Plan:  Patient's (Body mass index is 51.39 kg/m².) indicates that they are " morbidly/severely obese (BMI > 40 or > 35 with obesity - related health condition) with health conditions that include obstructive sleep apnea, hypertension, coronary heart disease, diabetes mellitus, dyslipidemias, GERD, peripheral vascular disease, osteoarthritis, lower extremity venous stasis disease, and hepatic steatosis . Weight is  unchanged . BMI  is above average; BMI management plan is completed. We discussed low calorie, low carb based diet program, portion control, increasing exercise, and consulting a Bariatric surgeon which she has done and is planning to have the surgical procedure.       10. Obstructive sleep apnea syndrome  Assessment & Plan:  Unable to use the CPAP at this time because she is homeless. Patient would certainly benefit from CPAP at night.       11. Homelessness  Assessment & Plan:  They are currently looking for permanent housing and I strongly encouraged her not to proceed with the surgical procedure until they have some sort of permanent housing in place.      12. Peripheral edema  Assessment & Plan:  Chronic and unchanged continue present treatment.      Other orders  -     Misc. Devices (Adjust Bath/Shower Seat) misc; Use 1 each Daily As Needed (For assist with shower). Unsteady gait with High fall risk, chronic SOB due to underlying COPD,  Dispense: 1 each; Refill: 0        Follow Up   Return in about 6 months (around 10/15/2025) for Annual physical.  Patient was given instructions and counseling regarding her condition or for health maintenance advice. Please see specific information pulled into the AVS if appropriate.     Radha Valladares PA-C

## 2025-04-15 NOTE — ASSESSMENT & PLAN NOTE
They are currently looking for permanent housing and I strongly encouraged her not to proceed with the surgical procedure until they have some sort of permanent housing in place.

## 2025-04-15 NOTE — ASSESSMENT & PLAN NOTE
This is managed by cardiology, she has had frequent hospitalizations related to this. Her ability to ambulate  Is significantly reduced due to her chronic shortness of breath from this and therefore I believe a wheelchair is a reasonable request.

## 2025-04-15 NOTE — ASSESSMENT & PLAN NOTE
Patient is high risk for falls due to unsteady gait, generalized muscle weakness and extreme obesity.

## 2025-04-15 NOTE — ASSESSMENT & PLAN NOTE
She is at increased fall risk with her morbid obesity, generalized muscle weakness, chronic pain and shortness of breath.  I do feel she would benefit from having a wheelchair and this has been ordered.  I also ordered the shower chair that she requested again because of her fall risk and she assures me that she is able to use that at the truck stop where they take regular showers.

## 2025-04-15 NOTE — ASSESSMENT & PLAN NOTE
Will evaluate labs and determine whether adjustments in medication are indicated.  Labs done recently at the [facility].

## 2025-04-15 NOTE — ASSESSMENT & PLAN NOTE
This is chronic and unchanged she does use her inhalers and they do help she states right now she is stable and doing fine.

## 2025-04-15 NOTE — ASSESSMENT & PLAN NOTE
Patient's (Body mass index is 51.39 kg/m².) indicates that they are morbidly/severely obese (BMI > 40 or > 35 with obesity - related health condition) with health conditions that include obstructive sleep apnea, hypertension, coronary heart disease, diabetes mellitus, dyslipidemias, GERD, peripheral vascular disease, osteoarthritis, lower extremity venous stasis disease, and hepatic steatosis . Weight is  unchanged . BMI  is above average; BMI management plan is completed. We discussed low calorie, low carb based diet program, portion control, increasing exercise, and consulting a Bariatric surgeon which she has done and is planning to have the surgical procedure.

## 2025-04-15 NOTE — ASSESSMENT & PLAN NOTE
"She is living in her car again, and is not able to use her oxygen or nebulizer, she says she is \"doing OK right now\"   They are looking for permanent housing prior to her planned gastric sleeve surgery. Her ability to ambulate  Is significantly reduced due to her chronic shortness of breath from this and therefore I believe a wheelchair is a reasonable request.  "

## 2025-04-17 ENCOUNTER — TELEPHONE (OUTPATIENT)
Dept: FAMILY MEDICINE CLINIC | Facility: CLINIC | Age: 65
End: 2025-04-17
Payer: MEDICAID

## 2025-04-17 RX ORDER — VARENICLINE TARTRATE 0.5 (11)-1
KIT ORAL
Qty: 1 EACH | Refills: 0 | Status: SHIPPED | OUTPATIENT
Start: 2025-04-17 | End: 2025-05-15

## 2025-04-17 RX ORDER — VARENICLINE TARTRATE 1 MG/1
1 TABLET, FILM COATED ORAL 2 TIMES DAILY
Qty: 56 TABLET | Refills: 1 | Status: SHIPPED | OUTPATIENT
Start: 2025-05-15 | End: 2025-07-10

## 2025-04-17 NOTE — TELEPHONE ENCOUNTER
Caller: Anastasiia Avilez    Relationship: Self    Best call back number:[phone]    What is the best time to reach you: ANYTIME     Who are you requesting to speak with (clinical staff, provider,  specific staff member): CLINICAL STAFF     PATIENT WAS WONDERING IF DELFINA HASTINGS COULD CALL IN A PRESCRIPTION OF CHANTIX PER REQUEST TO [facility]. PLEASE CALL TO DISCUSS IF NEEDED.

## 2025-04-21 ENCOUNTER — PATIENT OUTREACH (OUTPATIENT)
Dept: CASE MANAGEMENT | Facility: OTHER | Age: 65
End: 2025-04-21
Payer: MEDICAID

## 2025-04-21 ENCOUNTER — TELEPHONE (OUTPATIENT)
Dept: CASE MANAGEMENT | Facility: OTHER | Age: 65
End: 2025-04-21
Payer: MEDICAID

## 2025-04-21 DIAGNOSIS — J44.9 CHRONIC OBSTRUCTIVE PULMONARY DISEASE, UNSPECIFIED COPD TYPE: Primary | ICD-10-CM

## 2025-04-21 NOTE — OUTREACH NOTE
AMBULATORY CASE MANAGEMENT NOTE    Names and Relationships of Patient/Support Persons: Contact: Anastasiia Avilez; Relationship: Self -     CCM Interim Update    Patient called ACM back. ACM spoke with patient and discussed the purpose, goals, and expectations of the program. Reviewed possible costs and ability to stop program at any time. Patient decline at this time, she states she does not feel like she needs any additional assistance but will call ACM back if she changed her mind.         Education Documentation  No documentation found.        Heather HANKINS  Ambulatory Case Management    4/21/2025, 09:36 EDT

## 2025-04-23 ENCOUNTER — OFFICE VISIT (OUTPATIENT)
Dept: CARDIOLOGY | Facility: CLINIC | Age: 65
End: 2025-04-23
Payer: MEDICAID

## 2025-04-23 VITALS
OXYGEN SATURATION: 95 % | HEIGHT: 69 IN | HEART RATE: 64 BPM | SYSTOLIC BLOOD PRESSURE: 132 MMHG | BODY MASS INDEX: 43.4 KG/M2 | RESPIRATION RATE: 18 BRPM | DIASTOLIC BLOOD PRESSURE: 84 MMHG | WEIGHT: 293 LBS

## 2025-04-23 DIAGNOSIS — I50.30 DIASTOLIC CONGESTIVE HEART FAILURE, UNSPECIFIED HF CHRONICITY: Primary | ICD-10-CM

## 2025-04-23 DIAGNOSIS — I10 PRIMARY HYPERTENSION: ICD-10-CM

## 2025-04-23 DIAGNOSIS — R60.0 EDEMA LEG: ICD-10-CM

## 2025-04-23 RX ORDER — BUMETANIDE 1 MG/1
2 TABLET ORAL 2 TIMES DAILY
Qty: 360 TABLET | Refills: 1 | Status: SHIPPED | OUTPATIENT
Start: 2025-04-23

## 2025-04-23 RX ORDER — VALSARTAN 80 MG/1
80 TABLET ORAL DAILY
Qty: 90 TABLET | Refills: 3 | Status: SHIPPED | OUTPATIENT
Start: 2025-04-23

## 2025-04-23 NOTE — ASSESSMENT & PLAN NOTE
"Chronic edema with poor circulation.  Patient with HFpEF.  COPD with ongoing smoking. Plan:  Management of CHF as per problem list \"diastolic congestive heart failure\"  Encourage compression stockings, low-salt diet, leg elevation  Reinforced smoking cessation  "

## 2025-04-23 NOTE — PATIENT INSTRUCTIONS
[facility]  [facility] CARDIOLOGY  [address] [place] [address]  Dept: [phone]  Dept Fax: [phone]    Date:   Patient: Anastasiia Avilez    Blood Pressure Log  Provided By: Elpidio Bell MD    Date Blood Pressure Heart Rate Weight Comments   Thursday April 24, 2025        Friday April 25, 2025        Saturday April 26, 2025        Jori April 27, 2025        Monday April 28, 2025        Tuesday April 29, 2025        Wednesday April 30, 2025        Thursday May 1, 2025        Friday May 2, 2025        Saturday May 3, 2025        Jori May 4, 2025        Monday May 5, 2025        Tuesday May 6, 2025        Wednesday May 7, 2025        Thursday May 8, 2025        Friday May 9, 2025        Saturday May 10, 2025        Jori May 11, 2025        Monday May 12, 2025        Tuesday May 13, 2025        Wednesday May 14, 2025        Thursday May 15, 2025        Friday May 16, 2025        Saturday May 17, 2025        Jori May 18, 2025        Monday May 19, 2025        Tuesday May 20, 2025        Wednesday May 21, 2025        Thursday May 22, 2025        Friday May 23, 2025        Saturday May 24, 2025        Jori May 25, 2025        Monday May 26, 2025        Tuesday May 27, 2025        Wednesday May 28, 2025        Thursday May 29, 2025        Friday May 30, 2025        Saturday May 31, 2025        Jori June 1, 2025        Monday June 2, 2025        Tuesday Cathi 3, 2025        Wednesday June 4, 2025        Thursday June 5, 2025        Friday June 6, 2025        Saturday June 7, 2025        Jori June 8, 2025        Monday June 9, 2025        Tuesday Cathi 10, 2025        Wednesday June 11, 2025        Thursday June 12, 2025        Friday June 13, 2025        Saturday June 14, 2025        Jori Cathi 15, 2025        Monday June 16, 2025        Tuesday June 17, 2025        Wednesday June 18, 2025        Thursday June 19, 2025        Friday June 20, 2025        Saturday Cathi  21, 2025

## 2025-04-23 NOTE — ASSESSMENT & PLAN NOTE
Recent admission for CHF. Likely triggered by COPD exacerbation, subacute onset over days, no change in diet or compliance with medications.  Oncoming tobacco abuse.  EKG with biatrial enlargement in sinus rhythm.  Transthoracic echocardiogram with normal ejection fraction, grade 1 diastolic dysfunction, E/e' at 12 not suggesting high filling pressures, and poor RV visualization.  Blood pressure and heart rate controlled.  NT-proBNP normal range.  Currently patient with mild volume overload without any evidence of wheezing/COPD exacerbation.   Plan:  Resume bumetanide 2 mg PO twice daily, valsartan 80 mg p.o. daily for CHF, refills not sent for patient  Continue Jardiance 25 mg p.o. daily, spironolactone 50 mg p.o. daily  Check labs today for CHF control  Consider ischemic workup in the outpatient settings in view of possible bariatric surgery/preop

## 2025-04-23 NOTE — ASSESSMENT & PLAN NOTE
Hypertension is borderline.  Patient not taking valsartan prescribed in-house.    Refills sent for valsartan 80 mg p.o. daily and bumetanide 2 mg p.o. twice daily.  Dietary sodium restriction.  Weight loss.  Regular aerobic exercise.  Ambulatory blood pressure monitoring.  Blood pressure will be reassessedin 4 weeks.

## 2025-04-23 NOTE — PROGRESS NOTES
[id number]  [facility] [place]  [facility] CARDIOLOGY  [address] [place] [address]  Dept: [phone]  Dept Fax: [phone]    Date: 04/23/2025  Patient: Anastasiia Avilez  YOB: 1960    Follow Up Office Visit Note    Interval Follow-up  Ms. Anastasiia Avilez is a 65 y.o. female who is here for follow-up on Leg Swelling.    Subjective   Patient being seen for hospital discharge, admission for COPD/CHF exacerbation.  Patient doing better than during last admission.  Significant bilateral lower extremity edema reported when patient went home.  Patient denies angina, orthopnea, PND, palpitations, lightheadedness, syncope or medications side-effects.    The following portions of the patient's history were reviewed and updated as appropriate: allergies, current medications, past family history, past medical history, past social history, past surgical history, and problem list.    Medications:   Allergies   Allergen Reactions    Sulfa Antibiotics Rash, Itching, GI Intolerance and Hives    Adhesive Tape Rash      Current Outpatient Medications   Medication Instructions    albuterol (PROVENTIL) 2.5 mg, Nebulization, Every 4 Hours PRN    albuterol sulfate  (90 Base) MCG/ACT inhaler 2 puffs, Inhalation, Every 4 Hours PRN    bumetanide (BUMEX) 2 mg, Oral, 2 Times Daily    docusate sodium 100 mg, Oral, Daily    empagliflozin (JARDIANCE) 25 mg, Oral, Daily    FLUoxetine (PROZAC) 40 mg, Oral, Daily    fluticasone (FLONASE) 50 MCG/ACT nasal spray 2 sprays, Each Nare, Daily    gabapentin (NEURONTIN) 800 MG tablet 1 tablet, 3 times daily    HYDROcodone-acetaminophen (NORCO) 7.5-325 MG per tablet 1 tablet, 3 times daily    hydrOXYzine (ATARAX) 25 mg, Oral, 3 Times Daily    levothyroxine (SYNTHROID, LEVOTHROID) 75 mcg, Oral, Daily    meloxicam (MOBIC) 7.5 MG tablet     Misc. Devices (Adjust Bath/Shower Seat) misc 1 each, Not Applicable, Daily PRN, Unsteady gait with High fall risk, chronic SOB  "due to underlying COPD,    montelukast (SINGULAIR) 10 mg, Oral, Nightly    nystatin (MYCOSTATIN) 105863 UNIT/GM powder Topical, 3 Times Daily    ondansetron ODT (ZOFRAN-ODT) 4 mg, Translingual, Every 8 Hours PRN    oxybutynin (DITROPAN) 5 mg, Oral, 2 Times Daily    pantoprazole (PROTONIX) 40 mg, Oral, Daily    spironolactone (ALDACTONE) 50 mg, Oral, Daily    tiotropium (Spiriva HandiHaler) 18 MCG per inhalation capsule 1 capsule, Inhalation, Daily - RT    tiZANidine (ZANAFLEX) 4 mg, Oral, 3 Times Daily PRN    traZODone (DESYREL) 150 MG tablet TAKE 1 TO 2 TABLETS BY MOUTH EVERY NIGHT AT BEDTIME    valsartan (DIOVAN) 80 mg, Oral, Daily    [START ON 5/15/2025] varenicline (CHANTIX CONTINUING MONTH MARIE) 1 mg, Oral, 2 Times Daily    Varenicline Tartrate, Starter, 0.5 MG X 11 & 1 MG X 42 tablet therapy pack Take 0.5 mg by mouth Daily for 3 days, THEN 0.5 mg 2 (Two) Times a Day for 4 days, THEN 1 mg 2 (Two) Times a Day for 21 days. Take 0.5 mg po daily x 3 days, then 0.5 mg po bid x 4 days, then 1 mg po bid       Tobacco Use: High Risk (4/23/2025)    Patient History     Smoking Tobacco Use: Every Day     Smokeless Tobacco Use: Never     Passive Exposure: Not on file        Objective  Vitals:    04/23/25 0959   BP: 132/84   Pulse: 64   Resp: 18   SpO2: 95%   Weight: (!) 158 kg (348 lb)   Height: 175.3 cm (69\")   PainSc: 0-No pain      Vitals:    04/23/25 0959   BP: 132/84   Pulse: 64   Resp: 18   SpO2: 95%   Weight: (!) 158 kg (348 lb)   Height: 175.3 cm (69\")          Physical Exam  Constitutional:       Appearance: Not in distress.   Neck:      Vascular: JVD normal.   Pulmonary:      Breath sounds: Normal breath sounds.   Cardiovascular:      Normal rate. Regular rhythm.      Murmurs: There is no murmur.      Comments: 2+ bilateral lower extreme edema, partially pitting, poor circulation  Pulses:     Intact distal pulses.   Edema:     Pretibial: bilateral 2+ pitting edema of the pretibial area.     Ankle: bilateral 2+ " "pitting edema of the ankle.     Feet: bilateral 2+ pitting edema of the feet.  Neurological:      Mental Status: Alert.              Diagnostic Data  Lab Results   Component Value Date     12/24/2024    K 4.0 12/24/2024     12/24/2024    CO2 24 12/24/2024    BUN 15 12/24/2024    CREATININE 0.98 12/24/2024    CALCIUM 9.1 12/24/2024    BILITOT 0.2 08/06/2024    ALKPHOS 109 08/06/2024    ALT 28 08/06/2024    AST 19 08/06/2024    GLUCOSE 80 12/24/2024    ALBUMIN 3.8 (L) 08/06/2024     Lab Results   Component Value Date    WBC 12.9 (H) 02/28/2025    HGB 14.3 02/28/2025    HCT 45.5 (H) 02/28/2025     02/28/2025     No results found for: \"APTT\", \"INR\", \"PTT\"  Lab Results   Component Value Date    BNP CANCELED 12/11/2024     Lab Results   Component Value Date    BNP CANCELED 12/11/2024    PROBNP 154 12/24/2024       Lab Results   Component Value Date    CHLPL 167 08/06/2024    TRIG 111 08/06/2024    HDL 50 08/06/2024    LDL 97 08/06/2024     Lab Results   Component Value Date    TSH 7.610 (H) 12/11/2024    FREET4 0.83 (L) 09/21/2022       CV Diagnostics:  Procedures  CXR: Results for orders placed in visit on 05/09/23    XR Chest PA & Lateral    Narrative  XR CHEST PA AND LATERAL    Date of Exam: 5/9/2023 10:42 AM EDT    Indication: shortness of breath    Comparison: 3/10/2023.    Findings:  Chronic interstitial changes and atelectasis at the lung bases appear stable. There may be trace chronic pleural effusions. There is no new focal consolidation or pneumothorax. There is unchanged cardiac enlargement.    Impression  Impression:  Stable chronic findings as above, without evidence of acute disease.      Electronically Signed: Vic Small  5/9/2023 12:43 PM EDT  Workstation ID: [id number]     ECHO/MUGA: No results found for this or any previous visit.     STRESS TESTS: No results found for this or any previous visit.     CARDIAC CATH: No results found for this or any previous visit.     DEVICES: No valid " "procedures specified.   HOLTER: No results found for this or any previous visit.     CT/MRI:  No results found for this or any previous visit.    VASCULAR: No valid procedures specified.     Assessment and Plan  Diagnoses and all orders for this visit:    1. Diastolic congestive heart failure, unspecified HF chronicity (Primary)  Assessment & Plan:  Recent admission for CHF. Likely triggered by COPD exacerbation, subacute onset over days, no change in diet or compliance with medications.  Oncoming tobacco abuse.  EKG with biatrial enlargement in sinus rhythm.  Transthoracic echocardiogram with normal ejection fraction, grade 1 diastolic dysfunction, E/e' at 12 not suggesting high filling pressures, and poor RV visualization.  Blood pressure and heart rate controlled.  NT-proBNP normal range.  Currently patient with mild volume overload without any evidence of wheezing/COPD exacerbation.   Plan:  Resume bumetanide 2 mg PO twice daily, valsartan 80 mg p.o. daily for CHF, refills not sent for patient  Continue Jardiance 25 mg p.o. daily, spironolactone 50 mg p.o. daily  Check labs today for CHF control  Consider ischemic workup in the outpatient settings in view of possible bariatric surgery/preop    Orders:  -     proBNP  -     Magnesium  -     Comprehensive Metabolic Panel  -     CBC & Differential    2. Edema leg  Assessment & Plan:  Chronic edema with poor circulation.  Patient with HFpEF.  COPD with ongoing smoking. Plan:  Management of CHF as per problem list \"diastolic congestive heart failure\"  Encourage compression stockings, low-salt diet, leg elevation  Reinforced smoking cessation      3. Primary hypertension  Assessment & Plan:  Hypertension is borderline.  Patient not taking valsartan prescribed in-house.    Refills sent for valsartan 80 mg p.o. daily and bumetanide 2 mg p.o. twice daily.  Dietary sodium restriction.  Weight loss.  Regular aerobic exercise.  Ambulatory blood pressure monitoring.  Blood " pressure will be reassessedin 4 weeks.      Other orders  -     valsartan (DIOVAN) 80 MG tablet; Take 1 tablet by mouth Daily.  Dispense: 90 tablet; Refill: 3  -     bumetanide (BUMEX) 1 MG tablet; Take 2 tablets by mouth 2 (Two) Times a Day.  Dispense: 360 tablet; Refill: 1         Return in about 4 weeks (around 5/21/2025) for Follow-up with Dr Steinberg.    Patient Instructions   [facility]  [facility] CARDIOLOGY  [address] [place] [address]  Dept: [phone]  Dept Fax: [phone]    Date:   Patient: Anastasiia Avilez    Blood Pressure Log  Provided By: Elpidio Bell MD    Date Blood Pressure Heart Rate Weight Comments   Thursday April 24, 2025        Friday April 25, 2025        Saturday April 26, 2025        Jori April 27, 2025        Monday April 28, 2025        Tuesday April 29, 2025        Wednesday April 30, 2025        Thursday May 1, 2025        Friday May 2, 2025        Saturday May 3, 2025        Jori May 4, 2025        Monday May 5, 2025        Tuesday May 6, 2025        Wednesday May 7, 2025        Thursday May 8, 2025        Friday May 9, 2025        Saturday May 10, 2025        Jori May 11, 2025        Monday May 12, 2025        Tuesday May 13, 2025        Wednesday May 14, 2025        Thursday May 15, 2025        Friday May 16, 2025        Saturday May 17, 2025        Jori May 18, 2025        Monday May 19, 2025        Tuesday May 20, 2025        Wednesday May 21, 2025        Thursday May 22, 2025        Friday May 23, 2025        Saturday May 24, 2025        Jori May 25, 2025        Monday May 26, 2025        Tuesday May 27, 2025        Wednesday May 28, 2025        Thursday May 29, 2025        Friday May 30, 2025        Saturday May 31, 2025        Jori June 1, 2025        Monday June 2, 2025        Tuesday Cathi 3, 2025        Wednesday June 4, 2025        Thursday June 5, 2025        Friday June 6, 2025        Saturday June 7, 2025        Jori  June 8, 2025        Monday June 9, 2025        Tuesday Cathi 10, 2025        Wednesday June 11, 2025        Thursday June 12, 2025        Friday June 13, 2025        Saturday June 14, 2025        Jori Cathi 15, 2025        Monday June 16, 2025        Tuesday June 17, 2025        Wednesday June 18, 2025        Thursday June 19, 2025        Friday June 20, 2025        Saturday June 21, 2025             Elpidio Bell MD

## 2025-04-24 LAB
ALBUMIN SERPL-MCNC: 4.2 G/DL (ref 3.9–4.9)
ALP SERPL-CCNC: 125 IU/L (ref 44–121)
ALT SERPL-CCNC: 25 IU/L (ref 0–32)
AST SERPL-CCNC: 15 IU/L (ref 0–40)
BASOPHILS # BLD AUTO: 0.1 X10E3/UL (ref 0–0.2)
BASOPHILS NFR BLD AUTO: 1 %
BILIRUB SERPL-MCNC: 0.8 MG/DL (ref 0–1.2)
BUN SERPL-MCNC: 14 MG/DL (ref 8–27)
BUN/CREAT SERPL: 14 (ref 12–28)
CALCIUM SERPL-MCNC: 9.2 MG/DL (ref 8.7–10.3)
CHLORIDE SERPL-SCNC: 101 MMOL/L (ref 96–106)
CO2 SERPL-SCNC: 24 MMOL/L (ref 20–29)
CREAT SERPL-MCNC: 0.99 MG/DL (ref 0.57–1)
EGFRCR SERPLBLD CKD-EPI 2021: 64 ML/MIN/1.73
EOSINOPHIL # BLD AUTO: 0.2 X10E3/UL (ref 0–0.4)
EOSINOPHIL NFR BLD AUTO: 1 %
ERYTHROCYTE [DISTWIDTH] IN BLOOD BY AUTOMATED COUNT: 19.4 % (ref 11.7–15.4)
GLOBULIN SER CALC-MCNC: 2.4 G/DL (ref 1.5–4.5)
GLUCOSE SERPL-MCNC: 53 MG/DL (ref 70–99)
HCT VFR BLD AUTO: 51.3 % (ref 34–46.6)
HGB BLD-MCNC: 15.2 G/DL (ref 11.1–15.9)
IMM GRANULOCYTES # BLD AUTO: 0 X10E3/UL (ref 0–0.1)
IMM GRANULOCYTES NFR BLD AUTO: 0 %
LYMPHOCYTES # BLD AUTO: 1.7 X10E3/UL (ref 0.7–3.1)
LYMPHOCYTES NFR BLD AUTO: 12 %
MAGNESIUM SERPL-MCNC: 2.4 MG/DL (ref 1.6–2.3)
MCH RBC QN AUTO: 25.2 PG (ref 26.6–33)
MCHC RBC AUTO-ENTMCNC: 29.6 G/DL (ref 31.5–35.7)
MCV RBC AUTO: 85 FL (ref 79–97)
MONOCYTES # BLD AUTO: 0.7 X10E3/UL (ref 0.1–0.9)
MONOCYTES NFR BLD AUTO: 5 %
NEUTROPHILS # BLD AUTO: 11.4 X10E3/UL (ref 1.4–7)
NEUTROPHILS NFR BLD AUTO: 81 %
NT-PROBNP SERPL-MCNC: 103 PG/ML (ref 0–287)
PLATELET # BLD AUTO: 393 X10E3/UL (ref 150–450)
POTASSIUM SERPL-SCNC: 5 MMOL/L (ref 3.5–5.2)
PROT SERPL-MCNC: 6.6 G/DL (ref 6–8.5)
RBC # BLD AUTO: 6.02 X10E6/UL (ref 3.77–5.28)
SODIUM SERPL-SCNC: 142 MMOL/L (ref 134–144)
WBC # BLD AUTO: 14 X10E3/UL (ref 3.4–10.8)

## 2025-04-27 ENCOUNTER — RESULTS FOLLOW-UP (OUTPATIENT)
Dept: CARDIOLOGY | Facility: CLINIC | Age: 65
End: 2025-04-27
Payer: MEDICAID

## 2025-04-27 DIAGNOSIS — I50.30 DIASTOLIC CONGESTIVE HEART FAILURE, UNSPECIFIED HF CHRONICITY: ICD-10-CM

## 2025-04-27 DIAGNOSIS — N18.31 STAGE 3A CHRONIC KIDNEY DISEASE: Primary | ICD-10-CM

## 2025-04-29 NOTE — TELEPHONE ENCOUNTER
Call placed to patient to discuss blood work results. No answer, left voicemail requesting call back.   OK for Hub to relay-Please inform patient that her Blood work was abnormal:  - Magnesium was increased so to avoid any OTC magnesium supplements or GI medications containing magnesium.   - Blood sugar was low so to cut down Jardiance 25 mg to 1/2 tab daily and follow-up on sugar with PCP  - Rest of the blood work is normal including heart test  Thank you!

## 2025-04-30 NOTE — TELEPHONE ENCOUNTER
Patient notified of labs results, patient verbalized understanding.   Discussed jardiance dose change and patient verbalized understanding, med list updated

## 2025-05-13 ENCOUNTER — TELEPHONE (OUTPATIENT)
Dept: FAMILY MEDICINE CLINIC | Facility: CLINIC | Age: 65
End: 2025-05-13
Payer: MEDICAID

## 2025-05-13 NOTE — TELEPHONE ENCOUNTER
Caller: Anastasiia Avilez    Relationship: Self    Best call back number:   Telephone Information:   Mobile [phone]        What form or medical record are you requesting: PRESCRIPTION FOR NEW WHEELCHAIR     Who is requesting this form or medical record from you: XIANG    How would you like to receive the form or medical records (pick-up, mail, fax): FAX  If fax, what is the fax number: [phone]  If mail, what is the address:   If pick-up, provide patient with address and location details    Timeframe paperwork needed: ASAP    Additional notes:

## 2025-05-20 ENCOUNTER — TELEPHONE (OUTPATIENT)
Dept: FAMILY MEDICINE CLINIC | Facility: CLINIC | Age: 65
End: 2025-05-20

## 2025-05-20 ENCOUNTER — OFFICE VISIT (OUTPATIENT)
Dept: CARDIOLOGY | Facility: CLINIC | Age: 65
End: 2025-05-20
Payer: MEDICAID

## 2025-05-20 VITALS
BODY MASS INDEX: 43.4 KG/M2 | RESPIRATION RATE: 18 BRPM | HEART RATE: 84 BPM | SYSTOLIC BLOOD PRESSURE: 130 MMHG | WEIGHT: 293 LBS | DIASTOLIC BLOOD PRESSURE: 72 MMHG | HEIGHT: 69 IN | OXYGEN SATURATION: 99 % | TEMPERATURE: 97 F

## 2025-05-20 DIAGNOSIS — J43.1 PANLOBULAR EMPHYSEMA: ICD-10-CM

## 2025-05-20 DIAGNOSIS — I10 PRIMARY HYPERTENSION: Primary | ICD-10-CM

## 2025-05-20 DIAGNOSIS — F17.200 SMOKER: ICD-10-CM

## 2025-05-20 DIAGNOSIS — E66.01 MORBID OBESITY: ICD-10-CM

## 2025-05-20 PROBLEM — J90 PLEURAL EFFUSION: Status: RESOLVED | Noted: 2022-09-22 | Resolved: 2025-05-20

## 2025-05-20 RX ORDER — ROSUVASTATIN CALCIUM 5 MG/1
5 TABLET, COATED ORAL DAILY
Qty: 90 TABLET | Refills: 3 | Status: SHIPPED | OUTPATIENT
Start: 2025-05-20

## 2025-05-20 RX ORDER — APIXABAN 5 MG/1
5 TABLET, FILM COATED ORAL EVERY 12 HOURS SCHEDULED
COMMUNITY
Start: 2025-04-30

## 2025-05-20 NOTE — TELEPHONE ENCOUNTER
Caller: [facility]    Relationship: Other Relative    Best call back number: [phone]    What is the best time to reach you: ANYTIME    What was the call regarding: [facility] THAT PATIENT WAS HOSPITALIZED FROM 5/15-5/17 FOR PNEUMONIA. PLEASE ADVISE

## 2025-05-20 NOTE — PROGRESS NOTES
[facility] [place]  [facility] CARDIOLOGY  [address] [place] [address]  Dept: [phone]  Dept Fax: [phone]    Anastasiia Avilez  1960    Follow Up Office Visit Note    History of Present Illness:  Anastasiia Avilez is a 65 y.o. female who presents to the clinic for Follow-up, Shortness of Breath, Edema, and Pre-op Exam.  She has been at the hospital with COPD exacerbation and pneumonia, she also has developed episode of atrial flutter in 4,2025, she was placed on Eliquis 5mg bid and also coreg which I do not see on her list, she did not know the reason why she was placed on those meds, she is still smoking, has severe COPD and also diastolic heart failure - has chronic edema lower extremities seems more lipedema vs lymphedema,  On Bumex 2 mg bid Jardiance 25 mg, Aldactone 50 mg  and Valsartan 80, denies any chest pain, BP is 125.80 she is planning to have  bariatric surgery and is here for clearance- denies any chest pain, she has mild calcifications of the coronary arteries by CT chest, BP is 125.80 EKG sinus Hr 79, her risk is greater due her lung condition, COPD, but there is risk of atrial flutter during the procedure, , she seems stable, now,  will keep same meds    The following portions of the patient's history were reviewed and updated as appropriate: allergies, current medications, past family history, past medical history, past social history, past surgical history, and problem list.    Medications:  Adjust Bath/Shower Seat misc  albuterol  albuterol sulfate HFA  bumetanide  docusate sodium capsule  Eliquis tablet  empagliflozin tablet  FLUoxetine  fluticasone  gabapentin  HYDROcodone-acetaminophen  hydrOXYzine  levothyroxine  meloxicam  montelukast  nystatin  ondansetron ODT  oxybutynin  pantoprazole  rosuvastatin  spironolactone  tiotropium  tiZANidine  traZODone  valsartan  varenicline    Subjective  Allergies   Allergen Reactions    Sulfa Antibiotics Rash, Itching, GI  "Intolerance and Hives    Adhesive Tape Rash        Past Medical History:   Diagnosis Date    Multilevel degenerative disc disease        History reviewed. No pertinent surgical history.    History reviewed. No pertinent family history.     Social History     Socioeconomic History    Marital status: Single   Tobacco Use    Smoking status: Every Day     Current packs/day: 1.00     Average packs/day: 1 pack/day for 40.0 years (40.0 ttl pk-yrs)     Types: Cigarettes    Smokeless tobacco: Never   Vaping Use    Vaping status: Never Used   Substance and Sexual Activity    Alcohol use: Not Currently    Drug use: Never    Sexual activity: Defer       Review of Systems   Respiratory:  Positive for shortness of breath.    Cardiovascular:  Positive for leg swelling.       Cardiovascular Procedures    ECHO/MUGA:  STRESS TESTS:   CARDIAC CATH:   DEVICES:   HOLTER:   CT/MRI:   VASCULAR:   CARDIOTHORACIC:     Objective  Vitals:    05/20/25 0846   BP: 130/72   BP Location: Right arm   Patient Position: Lying   Cuff Size: Adult   Pulse: 84   Resp: 18   Temp: 97 °F (36.1 °C)   TempSrc: Infrared   SpO2: 99%   Weight: (!) 158 kg (348 lb)   Height: 175.3 cm (69\")   PainSc: 0-No pain     Body mass index is 51.39 kg/m².     Physical Exam  Vitals reviewed.   Constitutional:       Appearance: Healthy appearance. Not in distress.   Neck:      Vascular: No JVR. JVD normal.   Pulmonary:      Effort: Pulmonary effort is normal.      Breath sounds: Normal breath sounds. No wheezing. No rhonchi. No rales.   Chest:      Chest wall: Not tender to palpatation.   Cardiovascular:      PMI at left midclavicular line. Normal rate. Regular rhythm. Normal S1. Normal S2.       Murmurs: There is no murmur.      No gallop.  No click. No rub.   Pulses:     Intact distal pulses.   Edema:     Thigh: bilateral 1+ edema of the thigh.     Pretibial: bilateral 1+ edema of the pretibial area.     Ankle: bilateral 1+ edema of the ankle.     Feet: bilateral 1+ edema of " the feet.  Abdominal:      General: Bowel sounds are normal.      Palpations: Abdomen is soft.      Tenderness: There is no abdominal tenderness.   Musculoskeletal: Normal range of motion.         General: No tenderness. Skin:     General: Skin is warm and dry.   Neurological:      General: No focal deficit present.      Mental Status: Alert and oriented to person, place and time.        Diagnostic Data    ECG 12 Lead    Date/Time: 5/20/2025 5:01 PM  Performed by: Flex Steinberg MD    Authorized by: Flex Steinberg MD  Comparison: compared with previous ECG from 12/24/2024  Similar to previous ECG  Rhythm: sinus rhythm  Rate: normal  BPM: 79  QRS axis: normal  Other findings: right atrial abnormality    Clinical impression: abnormal EKG        Assessment and Plan  Diagnoses and all orders for this visit:    Primary hypertension- BP is 125.80 On Valsartan 80 mg, Bumex 2mg bid, Aldactone 50 mg    Panlobular emphysema- Per lung doctor    Morbid obesity- planning to have bariatric surgery    Smoker- advised to quit  Diastolic heart failure- Normal EF, on Bumex 2mg bid, Aldactone 50 mg and  jardiance     Other orders  -     Eliquis 5 MG tablet tablet; Take 1 tablet by mouth Every 12 (Twelve) Hours.  -     rosuvastatin (CRESTOR) 5 MG tablet; Take 1 tablet by mouth Daily.         Return in about 6 months (around 11/20/2025) for Recheck with Dr. Steinberg.    Flex Steinberg MD  05/20/2025

## 2025-05-21 ENCOUNTER — TELEPHONE (OUTPATIENT)
Dept: CARDIOLOGY | Facility: CLINIC | Age: 65
End: 2025-05-21

## 2025-05-21 NOTE — TELEPHONE ENCOUNTER
Caller: Anastasiia Avilez    Relationship: Self    Best call back number: [phone]      What is the best time to reach you: ANY    Who are you requesting to speak with (clinical staff, provider,  specific staff member): CLINICAL      What was the call regarding: PATIENT CALLING IN TO GIVE UPDATED MEDICATION LIST    Is it okay if the provider responds through [employer]: PLEASE CALL

## 2025-05-22 ENCOUNTER — TELEPHONE (OUTPATIENT)
Dept: FAMILY MEDICINE CLINIC | Facility: CLINIC | Age: 65
End: 2025-05-22
Payer: MEDICAID

## 2025-05-22 NOTE — TELEPHONE ENCOUNTER
[facility]  HUB TO RELAY---  Checking to see if patient has elected to get home oxygen from another facility. Also needing to know if patient has a home to get the home oxygen in.

## 2025-05-22 NOTE — TELEPHONE ENCOUNTER
Caller: Anastasiia Avilez    Relationship to patient: Self    Best call back number: [phone]    New or established patient?  [] New  [x] Established    Date of discharge: 5-17-25    [place] [facility]    Diagnosis/Symptoms: PNEUMONIA     Length of stay (If applicable): ADMITTED 05-15-25    Specialty Only: Did you see a Confucianism health provider?    [] Yes  [x] No      Additional Details: Pt insurance [facility] notified our office that the pt was [place] [facility] [place]. Pt was admitted 05-15-25 and D/C 05-17-25. Pt is scheduled for TCM appt with PCP. [place] have been [place] [facility]

## 2025-05-27 ENCOUNTER — OFFICE VISIT (OUTPATIENT)
Dept: FAMILY MEDICINE CLINIC | Facility: CLINIC | Age: 65
End: 2025-05-27
Payer: MEDICAID

## 2025-05-27 VITALS
SYSTOLIC BLOOD PRESSURE: 132 MMHG | BODY MASS INDEX: 43.4 KG/M2 | DIASTOLIC BLOOD PRESSURE: 70 MMHG | HEART RATE: 81 BPM | HEIGHT: 69 IN | RESPIRATION RATE: 18 BRPM | OXYGEN SATURATION: 93 % | WEIGHT: 293 LBS

## 2025-05-27 DIAGNOSIS — R11.0 CHRONIC NAUSEA: ICD-10-CM

## 2025-05-27 DIAGNOSIS — J41.8 MIXED SIMPLE AND MUCOPURULENT CHRONIC BRONCHITIS: Primary | ICD-10-CM

## 2025-05-27 DIAGNOSIS — I48.0 PAROXYSMAL A-FIB: ICD-10-CM

## 2025-05-27 DIAGNOSIS — E66.01 MORBID OBESITY: ICD-10-CM

## 2025-05-27 PROBLEM — J18.9 PNEUMONIA DUE TO INFECTIOUS ORGANISM: Status: RESOLVED | Noted: 2023-04-11 | Resolved: 2025-05-27

## 2025-05-27 RX ORDER — CARVEDILOL 6.25 MG/1
6.25 TABLET ORAL 2 TIMES DAILY WITH MEALS
Qty: 60 TABLET | Refills: 5 | Status: SHIPPED | OUTPATIENT
Start: 2025-05-27

## 2025-05-27 RX ORDER — APIXABAN 5 MG/1
5 TABLET, FILM COATED ORAL EVERY 12 HOURS SCHEDULED
Qty: 60 TABLET | Refills: 5 | Status: SHIPPED | OUTPATIENT
Start: 2025-05-27

## 2025-05-27 RX ORDER — FAMOTIDINE 40 MG/1
40 TABLET, FILM COATED ORAL NIGHTLY
Qty: 90 TABLET | Refills: 3 | Status: SHIPPED | OUTPATIENT
Start: 2025-05-27

## 2025-05-27 RX ORDER — ONDANSETRON 4 MG/1
4 TABLET, ORALLY DISINTEGRATING ORAL EVERY 8 HOURS PRN
Qty: 90 TABLET | Refills: 1 | Status: SHIPPED | OUTPATIENT
Start: 2025-05-27

## 2025-05-27 NOTE — PROGRESS NOTES
"Chief Complaint  Hospital Follow Up Visit (Has questions about her medications the hospital gave her)    Subjective          Anastasiia Avilez presents to [facility]  History of Present Illness patient is here today for hospital follow-up visit and she has questions about some changes to medications.  She was hospitalized for another bout of bronchitis this is a recurrent problem partly because they are homeless and while she is supposed to be using continuous oxygen she has no way to do this because of her current living situation.  She did say that she was quitting smoking and hopes that that would help.  She reports that she is feeling much better and they are continuing to work with  for placement.  I told her to let me know as soon as they have a place of residence and I would get her home oxygen arranged.  We did go over her medications and I explained some duplicates and removed those from her list.  She was noted to be in atrial fibs at this most recent hospitalization and was started on Eliquis and needed that refilled.  She also continues to complain of chronic nausea which has been extensively worked up by GI with no obvious cause identified but she requested that I refill her Zofran.  I wonder if it could be related to her hydrocodone.    Objective   Vital Signs:   /70 (BP Location: Other (Comment), Patient Position: Sitting, Cuff Size: Large Adult) Comment (BP Location): RT FOREARM  Pulse 81   Resp 18   Ht 175.3 cm (69.02\")   Wt (!) 158 kg (348 lb)   SpO2 93%   BMI 51.37 kg/m²     Body mass index is 51.37 kg/m².    Review of Systems   Constitutional:  Negative for chills, fatigue and fever.   Respiratory:  Positive for shortness of breath (chronic at baseline). Negative for cough, chest tightness and wheezing.    Cardiovascular:  Negative for chest pain and palpitations.   Musculoskeletal:  Negative for back pain and myalgias.   Neurological:  " Negative for dizziness and headache.   Psychiatric/Behavioral:  Negative for depressed mood. The patient is not nervous/anxious.        Past History:  Medical History: has a past medical history of Multilevel degenerative disc disease.   Surgical History: has no past surgical history on file.   Family History: family history is not on file.   Social History: reports that she has been smoking cigarettes. She has a 40 pack-year smoking history. She has never used smokeless tobacco. She reports that she does not currently use alcohol. She reports that she does not use drugs.      Current Outpatient Medications:     albuterol (PROVENTIL) (2.5 MG/3ML) 0.083% nebulizer solution, Take 2.5 mg by nebulization Every 4 (Four) Hours As Needed for Wheezing., Disp: 60 each, Rfl: 12    albuterol sulfate  (90 Base) MCG/ACT inhaler, Inhale 2 puffs Every 4 (Four) Hours As Needed for Wheezing., Disp: 18 g, Rfl: 12    bumetanide (BUMEX) 1 MG tablet, Take 2 tablets by mouth 2 (Two) Times a Day., Disp: 360 tablet, Rfl: 1    docusate sodium 100 MG capsule, Take 1 capsule by mouth Daily., Disp: 90 capsule, Rfl: 3    Eliquis 5 MG tablet tablet, Take 1 tablet by mouth Every 12 (Twelve) Hours., Disp: 60 tablet, Rfl: 5    empagliflozin (Jardiance) 25 MG tablet tablet, Take 0.5 tablets by mouth Daily., Disp: , Rfl:     FLUoxetine (PROzac) 40 MG capsule, Take 1 capsule by mouth Daily., Disp: 90 capsule, Rfl: 3    fluticasone (FLONASE) 50 MCG/ACT nasal spray, 2 sprays by Each Nare route Daily., Disp: 48 g, Rfl: 5    gabapentin (NEURONTIN) 800 MG tablet, Take 1 tablet by mouth 3 times a day., Disp: , Rfl:     HYDROcodone-acetaminophen (NORCO) 7.5-325 MG per tablet, Take 1 tablet by mouth 3 times a day. (Patient taking differently: Take 1 tablet by mouth Every 6 (Six) Hours As Needed.), Disp: , Rfl:     hydrOXYzine (ATARAX) 25 MG tablet, Take 1 tablet by mouth 3 (Three) Times a Day., Disp: 90 tablet, Rfl: 3    levothyroxine (SYNTHROID,  LEVOTHROID) 75 MCG tablet, Take 1 tablet by mouth Daily., Disp: 90 tablet, Rfl: 3    Misc. Devices (Adjust Bath/Shower Seat) misc, Use 1 each Daily As Needed (For assist with shower). Unsteady gait with High fall risk, chronic SOB due to underlying COPD,, Disp: 1 each, Rfl: 0    montelukast (SINGULAIR) 10 MG tablet, Take 1 tablet by mouth Every Night., Disp: 90 tablet, Rfl: 3    nystatin (MYCOSTATIN) 002194 UNIT/GM powder, Apply  topically to the appropriate area as directed 3 (Three) Times a Day., Disp: 60 g, Rfl: 5    ondansetron ODT (ZOFRAN-ODT) 4 MG disintegrating tablet, Place 1 tablet on the tongue Every 8 (Eight) Hours As Needed for Nausea or Vomiting., Disp: 90 tablet, Rfl: 1    oxybutynin (DITROPAN) 5 MG tablet, Take 1 tablet by mouth 2 (Two) Times a Day., Disp: 180 tablet, Rfl: 3    pantoprazole (PROTONIX) 40 MG EC tablet, Take 1 tablet by mouth Daily., Disp: 90 tablet, Rfl: 3    rosuvastatin (CRESTOR) 5 MG tablet, Take 1 tablet by mouth Daily., Disp: 90 tablet, Rfl: 3    spironolactone (ALDACTONE) 50 MG tablet, Take 1 tablet by mouth Daily., Disp: 90 tablet, Rfl: 3    tiotropium (Spiriva HandiHaler) 18 MCG per inhalation capsule, Place 1 capsule into inhaler and inhale Daily., Disp: 30 capsule, Rfl: 5    tiZANidine (ZANAFLEX) 4 MG tablet, Take 1 tablet by mouth 3 (Three) Times a Day As Needed for Muscle Spasms., Disp: 270 tablet, Rfl: 3    traZODone (DESYREL) 150 MG tablet, TAKE 1 TO 2 TABLETS BY MOUTH EVERY NIGHT AT BEDTIME, Disp: 180 tablet, Rfl: 3    valsartan (DIOVAN) 80 MG tablet, Take 1 tablet by mouth Daily., Disp: 90 tablet, Rfl: 3    varenicline (Chantix Continuing Month Abraham) 1 MG tablet, Take 1 tablet by mouth 2 (Two) Times a Day for 56 days., Disp: 56 tablet, Rfl: 1    carvedilol (Coreg) 6.25 MG tablet, Take 1 tablet by mouth 2 (Two) Times a Day With Meals., Disp: 60 tablet, Rfl: 5    famotidine (PEPCID) 40 MG tablet, Take 1 tablet by mouth Every Night., Disp: 90 tablet, Rfl: 3  Allergies:  "Sulfa antibiotics and Adhesive tape    Physical Exam  Constitutional:       Appearance: She is obese.   Cardiovascular:      Rate and Rhythm: Normal rate and regular rhythm.      Heart sounds: Normal heart sounds.   Pulmonary:      Effort: Pulmonary effort is normal. No respiratory distress.      Breath sounds: Normal breath sounds. No wheezing, rhonchi or rales.   Neurological:      Mental Status: She is alert and oriented to person, place, and time.   Psychiatric:         Behavior: Behavior normal.             Assessment and Plan   Diagnoses and all orders for this visit:    1. Mixed simple and mucopurulent chronic bronchitis (Primary)  Assessment & Plan:  She is living in her car again, and is not able to use her oxygen or nebulizer, she says she is \"doing OK right now\"   They are looking for permanent housing prior to her planned gastric sleeve surgery.  As soon as they obtain permanent housing she will let me know so I can get her oxygen therapy set up and resumed.    Orders:  -     CBC & Differential  -     Basic Metabolic Panel    2. Paroxysmal A-fib  Assessment & Plan:  She appears to be in sinus rhythm based on exam today and Eliquis was sent to her pharmacy is for continuation.  She will need to follow-up with cardiology.      3. Chronic nausea  Assessment & Plan:  She has had extensive workup by GI with no identified cause and I did refill her Zofran.       4. Morbid obesity  Assessment & Plan:  Patient's (Body mass index is 51.37 kg/m².) indicates that they are morbidly/severely obese (BMI > 40 or > 35 with obesity - related health condition) with health conditions that include obstructive sleep apnea, hypertension, dyslipidemias, GERD, osteoarthritis, and lower extremity venous stasis disease . Weight is unchanged. BMI  is above average; BMI management plan is completed. We discussed portion control, increasing exercise, and consulting a Bariatric surgeon.  She has been seen by the bariatric surgeon and " her blood work seems to be stable I think she is a good candidate for the bariatric surgery.      Other orders  -     Eliquis 5 MG tablet tablet; Take 1 tablet by mouth Every 12 (Twelve) Hours.  Dispense: 60 tablet; Refill: 5  -     carvedilol (Coreg) 6.25 MG tablet; Take 1 tablet by mouth 2 (Two) Times a Day With Meals.  Dispense: 60 tablet; Refill: 5  -     famotidine (PEPCID) 40 MG tablet; Take 1 tablet by mouth Every Night.  Dispense: 90 tablet; Refill: 3  -     ondansetron ODT (ZOFRAN-ODT) 4 MG disintegrating tablet; Place 1 tablet on the tongue Every 8 (Eight) Hours As Needed for Nausea or Vomiting.  Dispense: 90 tablet; Refill: 1            Follow Up   No follow-ups on file.  Patient was given instructions and counseling regarding her condition or for health maintenance advice. Please see specific information pulled into the AVS if appropriate.       Radha Valladares PA-C

## 2025-05-28 NOTE — ASSESSMENT & PLAN NOTE
She appears to be in sinus rhythm based on exam today and Eliquis was sent to her pharmacy is for continuation.  She will need to follow-up with cardiology.

## 2025-05-28 NOTE — ASSESSMENT & PLAN NOTE
"She is living in her car again, and is not able to use her oxygen or nebulizer, she says she is \"doing OK right now\"   They are looking for permanent housing prior to her planned gastric sleeve surgery.  As soon as they obtain permanent housing she will let me know so I can get her oxygen therapy set up and resumed.  "

## 2025-05-28 NOTE — ASSESSMENT & PLAN NOTE
Patient's (Body mass index is 51.37 kg/m².) indicates that they are morbidly/severely obese (BMI > 40 or > 35 with obesity - related health condition) with health conditions that include obstructive sleep apnea, hypertension, dyslipidemias, GERD, osteoarthritis, and lower extremity venous stasis disease . Weight is unchanged. BMI  is above average; BMI management plan is completed. We discussed portion control, increasing exercise, and consulting a Bariatric surgeon.  She has been seen by the bariatric surgeon and her blood work seems to be stable I think she is a good candidate for the bariatric surgery.

## 2025-05-29 ENCOUNTER — RESULTS FOLLOW-UP (OUTPATIENT)
Dept: FAMILY MEDICINE CLINIC | Facility: CLINIC | Age: 65
End: 2025-05-29
Payer: MEDICAID

## 2025-05-29 LAB
BASOPHILS # BLD AUTO: 0 X10E3/UL (ref 0–0.2)
BASOPHILS # BLD AUTO: 0 X10E3/UL (ref 0–0.2)
BASOPHILS NFR BLD AUTO: 0 %
BASOPHILS NFR BLD AUTO: 0 %
BUN SERPL-MCNC: 26 MG/DL (ref 8–27)
BUN SERPL-MCNC: 26 MG/DL (ref 8–27)
BUN/CREAT SERPL: 23 (ref 12–28)
BUN/CREAT SERPL: 23 (ref 12–28)
CALCIUM SERPL-MCNC: 9.5 MG/DL (ref 8.7–10.3)
CALCIUM SERPL-MCNC: 9.5 MG/DL (ref 8.7–10.3)
CHLORIDE SERPL-SCNC: 100 MMOL/L (ref 96–106)
CHLORIDE SERPL-SCNC: 100 MMOL/L (ref 96–106)
CO2 SERPL-SCNC: 23 MMOL/L (ref 20–29)
CO2 SERPL-SCNC: 23 MMOL/L (ref 20–29)
CREAT SERPL-MCNC: 1.15 MG/DL (ref 0.57–1)
CREAT SERPL-MCNC: 1.15 MG/DL (ref 0.57–1)
EGFRCR SERPLBLD CKD-EPI 2021: 53 ML/MIN/1.73
EGFRCR SERPLBLD CKD-EPI 2021: 53 ML/MIN/1.73
EOSINOPHIL # BLD AUTO: 0 X10E3/UL (ref 0–0.4)
EOSINOPHIL # BLD AUTO: 0 X10E3/UL (ref 0–0.4)
EOSINOPHIL NFR BLD AUTO: 0 %
EOSINOPHIL NFR BLD AUTO: 0 %
ERYTHROCYTE [DISTWIDTH] IN BLOOD BY AUTOMATED COUNT: 19.7 % (ref 11.7–15.4)
ERYTHROCYTE [DISTWIDTH] IN BLOOD BY AUTOMATED COUNT: 19.7 % (ref 11.7–15.4)
GLUCOSE SERPL-MCNC: 87 MG/DL (ref 70–99)
GLUCOSE SERPL-MCNC: 87 MG/DL (ref 70–99)
HCT VFR BLD AUTO: 56.5 % (ref 34–46.6)
HCT VFR BLD AUTO: 56.5 % (ref 34–46.6)
HGB BLD-MCNC: 17 G/DL (ref 11.1–15.9)
HGB BLD-MCNC: 17 G/DL (ref 11.1–15.9)
IMM GRANULOCYTES # BLD AUTO: 0 X10E3/UL (ref 0–0.1)
IMM GRANULOCYTES # BLD AUTO: 0 X10E3/UL (ref 0–0.1)
IMM GRANULOCYTES NFR BLD AUTO: 0 %
IMM GRANULOCYTES NFR BLD AUTO: 0 %
LYMPHOCYTES # BLD AUTO: 1.8 X10E3/UL (ref 0.7–3.1)
LYMPHOCYTES # BLD AUTO: 1.8 X10E3/UL (ref 0.7–3.1)
LYMPHOCYTES NFR BLD AUTO: 12 %
LYMPHOCYTES NFR BLD AUTO: 12 %
Lab: NORMAL
Lab: NORMAL
MCH RBC QN AUTO: 26.1 PG (ref 26.6–33)
MCH RBC QN AUTO: 26.1 PG (ref 26.6–33)
MCHC RBC AUTO-ENTMCNC: 30.1 G/DL (ref 31.5–35.7)
MCHC RBC AUTO-ENTMCNC: 30.1 G/DL (ref 31.5–35.7)
MCV RBC AUTO: 87 FL (ref 79–97)
MCV RBC AUTO: 87 FL (ref 79–97)
MONOCYTES # BLD AUTO: 0.7 X10E3/UL (ref 0.1–0.9)
MONOCYTES # BLD AUTO: 0.7 X10E3/UL (ref 0.1–0.9)
MONOCYTES NFR BLD AUTO: 5 %
MONOCYTES NFR BLD AUTO: 5 %
NEUTROPHILS # BLD AUTO: 11.7 X10E3/UL (ref 1.4–7)
NEUTROPHILS # BLD AUTO: 11.7 X10E3/UL (ref 1.4–7)
NEUTROPHILS NFR BLD AUTO: 83 %
NEUTROPHILS NFR BLD AUTO: 83 %
PLATELET # BLD AUTO: 308 X10E3/UL (ref 150–450)
PLATELET # BLD AUTO: 308 X10E3/UL (ref 150–450)
POTASSIUM SERPL-SCNC: 4.1 MMOL/L (ref 3.5–5.2)
POTASSIUM SERPL-SCNC: 4.1 MMOL/L (ref 3.5–5.2)
RBC # BLD AUTO: 6.51 X10E6/UL (ref 3.77–5.28)
RBC # BLD AUTO: 6.51 X10E6/UL (ref 3.77–5.28)
SODIUM SERPL-SCNC: 143 MMOL/L (ref 134–144)
SODIUM SERPL-SCNC: 143 MMOL/L (ref 134–144)
WBC # BLD AUTO: 14.3 X10E3/UL (ref 3.4–10.8)
WBC # BLD AUTO: 14.3 X10E3/UL (ref 3.4–10.8)

## 2025-06-03 ENCOUNTER — TELEPHONE (OUTPATIENT)
Dept: FAMILY MEDICINE CLINIC | Facility: CLINIC | Age: 65
End: 2025-06-03
Payer: MEDICAID

## 2025-06-03 NOTE — TELEPHONE ENCOUNTER
Caller: Anastasiia Avilez    Relationship: Self    Best call back number: [phone]    What is the best time to reach you: ANYTIME     Who are you requesting to speak with (clinical staff, provider,  specific staff member): CLINICAL STAFF     PATIENT STATES COMPANY SHE IS GETTING WHEELCHAIR FROM IS NEEDING MORE INFORMATION FROM THE OFFICE. COMPANY TOLD PATIENT THEY HAVE FAXED PAPERWORK WITH NEEDS ON IT. THEY ARE NEEDING IT BACK ASAP SO SHE CAN GET HER WHEELCHAIR.

## 2025-06-05 ENCOUNTER — TELEPHONE (OUTPATIENT)
Dept: FAMILY MEDICINE CLINIC | Facility: CLINIC | Age: 65
End: 2025-06-05
Payer: MEDICAID

## 2025-06-05 NOTE — TELEPHONE ENCOUNTER
Suhailtyrone needs updated notes for wheelchair order. Notes need to be why she can't use cane or walker for mobility.

## 2025-06-06 ENCOUNTER — TELEPHONE (OUTPATIENT)
Dept: FAMILY MEDICINE CLINIC | Facility: CLINIC | Age: 65
End: 2025-06-06

## 2025-06-06 NOTE — TELEPHONE ENCOUNTER
Caller: Anastasiia Avilez    Relationship to patient: Self      Provider: DELFINA MEANS     Medication PA needed: OMEPRAZOLE 20MG

## 2025-06-10 RX ORDER — PANTOPRAZOLE SODIUM 40 MG/1
40 TABLET, DELAYED RELEASE ORAL DAILY
Qty: 90 TABLET | Refills: 3 | Status: SHIPPED | OUTPATIENT
Start: 2025-06-10 | End: 2025-06-10 | Stop reason: CLARIF

## 2025-06-10 RX ORDER — OMEPRAZOLE 40 MG/1
40 CAPSULE, DELAYED RELEASE ORAL DAILY
Qty: 90 CAPSULE | Refills: 1 | Status: SHIPPED | OUTPATIENT
Start: 2025-06-10

## 2025-06-10 NOTE — TELEPHONE ENCOUNTER
Caller: Anastasiia Avilez    Relationship: Self    Best call back number: [phone]       What was the call regarding: PATIENT STATED THAT SHE WAS INFORMED BY THE PHARMACY THAT INSURANCE WILL NOT COVER MEDICATION WITHOUT A PRIOR AUTHORIZATION AND WILL ONLY COVER MEDICATION WITHOUT A PRIOR AUTHORIZATION IF MEDICATION IS INCREASED TO 40 MG TABLET INSTEAD     OMEPRAZOLE 20MG

## 2025-06-16 ENCOUNTER — OUTSIDE FACILITY SERVICE (OUTPATIENT)
Dept: CARDIOLOGY | Facility: CLINIC | Age: 65
End: 2025-06-16
Payer: MEDICAID

## 2025-06-16 PROCEDURE — 99214 OFFICE O/P EST MOD 30 MIN: CPT | Performed by: INTERNAL MEDICINE

## 2025-06-17 ENCOUNTER — OUTSIDE FACILITY SERVICE (OUTPATIENT)
Dept: CARDIOLOGY | Facility: CLINIC | Age: 65
End: 2025-06-17
Payer: MEDICAID

## 2025-06-17 PROCEDURE — 99214 OFFICE O/P EST MOD 30 MIN: CPT | Performed by: INTERNAL MEDICINE

## 2025-06-18 ENCOUNTER — OUTSIDE FACILITY SERVICE (OUTPATIENT)
Dept: CARDIOLOGY | Facility: CLINIC | Age: 65
End: 2025-06-18
Payer: MEDICAID

## 2025-06-18 PROCEDURE — 99232 SBSQ HOSP IP/OBS MODERATE 35: CPT | Performed by: INTERNAL MEDICINE

## 2025-06-19 ENCOUNTER — OUTSIDE FACILITY SERVICE (OUTPATIENT)
Dept: CARDIOLOGY | Facility: CLINIC | Age: 65
End: 2025-06-19
Payer: MEDICAID

## 2025-06-19 PROCEDURE — 99232 SBSQ HOSP IP/OBS MODERATE 35: CPT | Performed by: INTERNAL MEDICINE

## 2025-06-20 ENCOUNTER — OUTSIDE FACILITY SERVICE (OUTPATIENT)
Dept: CARDIOLOGY | Facility: CLINIC | Age: 65
End: 2025-06-20
Payer: MEDICAID

## 2025-06-20 PROCEDURE — 99232 SBSQ HOSP IP/OBS MODERATE 35: CPT | Performed by: INTERNAL MEDICINE

## 2025-06-21 ENCOUNTER — OUTSIDE FACILITY SERVICE (OUTPATIENT)
Dept: CARDIOLOGY | Facility: CLINIC | Age: 65
End: 2025-06-21
Payer: MEDICAID

## 2025-06-21 PROCEDURE — 99232 SBSQ HOSP IP/OBS MODERATE 35: CPT | Performed by: INTERNAL MEDICINE

## 2025-06-22 ENCOUNTER — OUTSIDE FACILITY SERVICE (OUTPATIENT)
Dept: CARDIOLOGY | Facility: CLINIC | Age: 65
End: 2025-06-22
Payer: MEDICAID

## 2025-06-22 PROCEDURE — 99232 SBSQ HOSP IP/OBS MODERATE 35: CPT | Performed by: INTERNAL MEDICINE

## 2025-06-23 ENCOUNTER — OUTSIDE FACILITY SERVICE (OUTPATIENT)
Dept: CARDIOLOGY | Facility: CLINIC | Age: 65
End: 2025-06-23
Payer: MEDICAID

## 2025-06-23 PROCEDURE — 99232 SBSQ HOSP IP/OBS MODERATE 35: CPT | Performed by: INTERNAL MEDICINE

## 2025-06-24 ENCOUNTER — OUTSIDE FACILITY SERVICE (OUTPATIENT)
Dept: CARDIOLOGY | Facility: CLINIC | Age: 65
End: 2025-06-24
Payer: MEDICAID

## 2025-06-24 PROCEDURE — 99232 SBSQ HOSP IP/OBS MODERATE 35: CPT | Performed by: INTERNAL MEDICINE

## 2025-06-25 ENCOUNTER — OUTSIDE FACILITY SERVICE (OUTPATIENT)
Dept: CARDIOLOGY | Facility: CLINIC | Age: 65
End: 2025-06-25
Payer: MEDICAID

## 2025-06-25 PROCEDURE — 99232 SBSQ HOSP IP/OBS MODERATE 35: CPT | Performed by: INTERNAL MEDICINE

## 2025-06-26 ENCOUNTER — TELEPHONE (OUTPATIENT)
Dept: CARDIOLOGY | Facility: CLINIC | Age: 65
End: 2025-06-26
Payer: MEDICAID

## 2025-06-26 NOTE — TELEPHONE ENCOUNTER
[place] [facility] [place]. Pt needs a 2-3 week follow up.  Attempted to call patient to make appointment, but call went straight to voicemail. Left a message for patient to call back to schedule.  [facility] [employer] TO SCHEDULE 2-3 WKS OUT FROM 6/25 DISCHARGE DATE IF PT CALLS BACK.

## 2025-06-30 NOTE — TELEPHONE ENCOUNTER
Attempted to reach pt again and call went to voicemail. Left another message to call back and get scheduled.  [employer] TO SCHEDULE FIRST AVAILABLE IF PT CALLS BACK

## 2025-07-02 ENCOUNTER — READMISSION MANAGEMENT (OUTPATIENT)
Dept: CALL CENTER | Facility: HOSPITAL | Age: 65
End: 2025-07-02
Payer: MEDICAID

## 2025-07-02 NOTE — OUTREACH NOTE
Prep Survey      Flowsheet Row Responses   [facility] patient discharged from? Non-BH   Is LACE score < 7 ? Non- Discharge   Eligibility [facility]   Date of Admission 06/25/25   Date of Discharge 07/02/25   Discharge diagnosis Suspected CHF (congestive heart failure)   Does the patient have one of the following disease processes/diagnoses(primary or secondary)? CHF   Prep survey completed? Yes            Mikki CABRERA - Registered Nurse

## 2025-07-03 ENCOUNTER — TRANSITIONAL CARE MANAGEMENT TELEPHONE ENCOUNTER (OUTPATIENT)
Dept: CALL CENTER | Facility: HOSPITAL | Age: 65
End: 2025-07-03
Payer: MEDICAID

## 2025-07-03 NOTE — OUTREACH NOTE
Call Center TCM Note      Flowsheet Row Responses   [facility] facility patient discharged from? Non-BH   Does the patient have one of the following disease processes/diagnoses(primary or secondary)? CHF   TCM attempt successful? No   Unsuccessful attempts Attempt 2   Call Status Left message            Blank King Registered Nurse    7/3/2025, 11:38 EDT

## 2025-07-03 NOTE — OUTREACH NOTE
Call Center TCM Note      Flowsheet Row Responses   [facility] facility patient discharged from? Non-BH   Does the patient have one of the following disease processes/diagnoses(primary or secondary)? CHF   TCM attempt successful? No   Unsuccessful attempts Attempt 1   Call Status Left message            Blank King Registered Nurse    7/3/2025, 09:45 EDT

## 2025-07-07 ENCOUNTER — TRANSITIONAL CARE MANAGEMENT TELEPHONE ENCOUNTER (OUTPATIENT)
Dept: CALL CENTER | Facility: HOSPITAL | Age: 65
End: 2025-07-07
Payer: MEDICAID

## 2025-07-07 NOTE — OUTREACH NOTE
Call Center TCM Note      Flowsheet Row Responses   [facility] facility patient discharged from? Non-BH   Does the patient have one of the following disease processes/diagnoses(primary or secondary)? CHF   TCM attempt successful? No   Unsuccessful attempts Attempt 3            Silvio GONSALVES - Registered Nurse    7/7/2025, 10:04 EDT

## 2025-07-24 ENCOUNTER — TELEPHONE (OUTPATIENT)
Dept: FAMILY MEDICINE CLINIC | Facility: CLINIC | Age: 65
End: 2025-07-24
Payer: MEDICAID

## 2025-07-24 NOTE — TELEPHONE ENCOUNTER
Caller: CORINE WITH [employer]     Relationship: [unfilled]     Best call back number: [phone]    What is your medical concern? CALLED TO MAKE PROVIDER AWARE, [place] [facility]

## 2025-07-24 NOTE — TELEPHONE ENCOUNTER
Second attempt to schedule TCM, no answer  Attempted to reach Aleisha from [facility] case management, notified her of multiple unsuccessful attempts to schedule pt TCM appt